# Patient Record
Sex: FEMALE | Race: WHITE | NOT HISPANIC OR LATINO | Employment: OTHER | URBAN - METROPOLITAN AREA
[De-identification: names, ages, dates, MRNs, and addresses within clinical notes are randomized per-mention and may not be internally consistent; named-entity substitution may affect disease eponyms.]

---

## 2017-02-22 ENCOUNTER — OFFICE VISIT (OUTPATIENT)
Dept: OBSTETRICS AND GYNECOLOGY | Facility: CLINIC | Age: 62
End: 2017-02-22

## 2017-02-22 ENCOUNTER — PROCEDURE VISIT (OUTPATIENT)
Dept: OBSTETRICS AND GYNECOLOGY | Facility: CLINIC | Age: 62
End: 2017-02-22

## 2017-02-22 ENCOUNTER — APPOINTMENT (OUTPATIENT)
Dept: WOMENS IMAGING | Facility: HOSPITAL | Age: 62
End: 2017-02-22

## 2017-02-22 VITALS
SYSTOLIC BLOOD PRESSURE: 153 MMHG | WEIGHT: 146 LBS | HEART RATE: 82 BPM | BODY MASS INDEX: 29.43 KG/M2 | DIASTOLIC BLOOD PRESSURE: 82 MMHG | HEIGHT: 59 IN

## 2017-02-22 DIAGNOSIS — Z01.411 ENCOUNTER FOR GYNECOLOGICAL EXAMINATION WITH ABNORMAL FINDING: ICD-10-CM

## 2017-02-22 DIAGNOSIS — Z12.11 COLON CANCER SCREENING: ICD-10-CM

## 2017-02-22 DIAGNOSIS — Z12.31 VISIT FOR SCREENING MAMMOGRAM: Primary | ICD-10-CM

## 2017-02-22 DIAGNOSIS — N76.0 ACUTE VAGINITIS: Primary | ICD-10-CM

## 2017-02-22 LAB — HEMOCCULT STL QL IA: NEGATIVE

## 2017-02-22 PROCEDURE — 77067 SCR MAMMO BI INCL CAD: CPT | Performed by: RADIOLOGY

## 2017-02-22 PROCEDURE — 82274 ASSAY TEST FOR BLOOD FECAL: CPT | Performed by: OBSTETRICS & GYNECOLOGY

## 2017-02-22 PROCEDURE — 99396 PREV VISIT EST AGE 40-64: CPT | Performed by: OBSTETRICS & GYNECOLOGY

## 2017-02-22 PROCEDURE — 77067 SCR MAMMO BI INCL CAD: CPT | Performed by: OBSTETRICS & GYNECOLOGY

## 2017-02-22 RX ORDER — LISINOPRIL 20 MG/1
20 TABLET ORAL DAILY
COMMUNITY

## 2017-02-22 RX ORDER — VENLAFAXINE 37.5 MG/1
37.5 TABLET ORAL 2 TIMES DAILY
COMMUNITY
End: 2017-02-22

## 2017-02-22 RX ORDER — VENLAFAXINE 75 MG/1
75 TABLET ORAL 2 TIMES DAILY
Qty: 180 TABLET | Refills: 3 | Status: SHIPPED | OUTPATIENT
Start: 2017-02-22 | End: 2017-03-02

## 2017-02-22 RX ORDER — CHLORAL HYDRATE 500 MG
CAPSULE ORAL
COMMUNITY

## 2017-02-22 RX ORDER — LOVASTATIN 40 MG/1
40 TABLET ORAL NIGHTLY
COMMUNITY
End: 2019-06-24

## 2017-02-22 RX ORDER — VENLAFAXINE 37.5 MG/1
75 TABLET ORAL 2 TIMES DAILY
COMMUNITY
Start: 2017-02-22 | End: 2017-02-22

## 2017-02-22 RX ORDER — GLIMEPIRIDE 1 MG/1
1 TABLET ORAL
COMMUNITY
End: 2018-03-15

## 2017-02-22 RX ORDER — CETIRIZINE HYDROCHLORIDE 10 MG/1
10 TABLET ORAL DAILY
COMMUNITY

## 2017-02-22 RX ORDER — PANTOPRAZOLE SODIUM 40 MG/1
40 TABLET, DELAYED RELEASE ORAL DAILY
COMMUNITY
End: 2018-06-11

## 2017-02-22 RX ORDER — PHENOL 1.4 %
600 AEROSOL, SPRAY (ML) MUCOUS MEMBRANE DAILY
COMMUNITY
End: 2019-07-17

## 2017-02-22 NOTE — PROGRESS NOTES
"Subjective   Jerilyn Lozano is a 61 y.o. female annual exam.    History of Present Illness    Patient is a 61 y.o. for annual exam. C/o vaginal irritation for the past few months off and on. Patient's sugars have been stable. Patient denies any vaginal discharge. Patient has noted an increase in her hot flashes and would like to increase her effexor.     Health Maintenance   Topic Date Due   • TDAP/TD VACCINES (1 - Tdap) 11/19/1973   • INFLUENZA VACCINE  08/01/2016   • HEPATITIS C SCREENING  02/22/2017   • ZOSTER VACCINE  02/22/2017   • DXA SCAN  02/22/2017   • MAMMOGRAM  02/22/2018   • COLONOSCOPY  04/01/2018       The following portions of the patient's history were reviewed and updated as appropriate: allergies, current medications, past family history, past medical history, past social history, past surgical history and problem list.    Review of Systems   Genitourinary: Negative for menstrual problem and vaginal discharge.   All other systems reviewed and are negative.      Vitals:    02/22/17 1110   BP: 153/82   Pulse: 82   Weight: 146 lb (66.2 kg)   Height: 58.75\" (149.2 cm)       Objective   Physical Exam   Constitutional: She is oriented to person, place, and time. She appears well-developed and well-nourished.   HENT:   Head: Normocephalic and atraumatic.   Eyes: Conjunctivae are normal.   Neck: Neck supple. No thyromegaly present.   Cardiovascular: Normal rate, regular rhythm and normal heart sounds.    Pulmonary/Chest: Effort normal and breath sounds normal. Right breast exhibits no mass and no tenderness. Left breast exhibits no mass and no tenderness.   Abdominal: Soft. She exhibits no distension and no mass. There is no tenderness.   Genitourinary: Vagina normal.   Genitourinary Comments: Uterus, cervix absent, atrophy noted   Musculoskeletal: Normal range of motion.   Neurological: She is alert and oriented to person, place, and time.   Skin: Skin is warm and dry.   Psychiatric: She has a normal mood and " affect. Her behavior is normal.         Assessment/Plan   Jerilyn was seen today for gynecologic exam.    Diagnoses and all orders for this visit:    Acute vaginitis  -     NuSwab Vaginitis (VG)    Encounter for gynecological examination with abnormal finding    Colon cancer screening  -     POC FECAL OCCULT BLOOD BY IMMUNOASSAY    Other orders  -     venlafaxine (EFFEXOR) 75 MG tablet; Take 1 tablet by mouth 2 (Two) Times a Day.    Mammogram today.         Return in about 1 year (around 2/22/2018).

## 2017-02-27 LAB
A VAGINAE DNA VAG QL NAA+PROBE: NORMAL SCORE
BVAB2 DNA VAG QL NAA+PROBE: NORMAL SCORE
C ALBICANS DNA VAG QL NAA+PROBE: NEGATIVE
C GLABRATA DNA VAG QL NAA+PROBE: NEGATIVE
MEGA1 DNA VAG QL NAA+PROBE: NORMAL SCORE
T VAGINALIS RRNA SPEC QL NAA+PROBE: NEGATIVE

## 2017-03-02 ENCOUNTER — TELEPHONE (OUTPATIENT)
Dept: OBSTETRICS AND GYNECOLOGY | Facility: CLINIC | Age: 62
End: 2017-03-02

## 2017-03-02 RX ORDER — VENLAFAXINE HYDROCHLORIDE 75 MG/1
75 CAPSULE, EXTENDED RELEASE ORAL DAILY
Qty: 90 CAPSULE | Refills: 3 | Status: SHIPPED | OUTPATIENT
Start: 2017-03-02 | End: 2018-06-11

## 2017-03-02 NOTE — TELEPHONE ENCOUNTER
----- Message from Zoe Allred sent at 3/2/2017  2:09 PM EST -----  Patient was on Effexor 37.5mg once a day.  Wanted to increase, so was prescribed Effexor 75mg twice a day.  Patient concerned that's it's for twice a day - thinks it should only be for once - wanted me to double check with you.     It was sent over incorrect. Correct prescription of 75 mg once a day sent.

## 2017-03-09 ENCOUNTER — TELEPHONE (OUTPATIENT)
Dept: OBSTETRICS AND GYNECOLOGY | Facility: CLINIC | Age: 62
End: 2017-03-09

## 2017-03-09 NOTE — TELEPHONE ENCOUNTER
----- Message from Korin Russell MA sent at 3/3/2017  9:20 AM EST -----  Left another message to call [email or web address]

## 2018-03-01 ENCOUNTER — TELEPHONE (OUTPATIENT)
Dept: OBSTETRICS AND GYNECOLOGY | Facility: CLINIC | Age: 63
End: 2018-03-01

## 2018-03-01 NOTE — TELEPHONE ENCOUNTER
----- Message from Cynthia Vila MD sent at 3/1/2018  1:16 PM EST -----  Contact: Patient  I am seeing her in March.  I apologize that she received the wrong dose.  I can call in the higher dose if she wants to try it or she can come off of it.    ----- Message -----     From: Maine Felixalonso     Sent: 3/1/2018  10:46 AM       To: Cynthia Vila MD    Patient called stating that the pills you had put her on for hot flashes are not working so she would like to come off of them. She also stated that when she went to  her refill of them they gave her 37.5 mg pills instead of 75 mg, so she wasn't sure if she should just stop taking them or if she should take the 37.5 mg pills to ween herself off of them.     Call back # [phone]

## 2018-03-15 ENCOUNTER — APPOINTMENT (OUTPATIENT)
Dept: WOMENS IMAGING | Facility: HOSPITAL | Age: 63
End: 2018-03-15

## 2018-03-15 ENCOUNTER — OFFICE VISIT (OUTPATIENT)
Dept: OBSTETRICS AND GYNECOLOGY | Facility: CLINIC | Age: 63
End: 2018-03-15

## 2018-03-15 ENCOUNTER — PROCEDURE VISIT (OUTPATIENT)
Dept: OBSTETRICS AND GYNECOLOGY | Facility: CLINIC | Age: 63
End: 2018-03-15

## 2018-03-15 VITALS
SYSTOLIC BLOOD PRESSURE: 142 MMHG | BODY MASS INDEX: 31.25 KG/M2 | WEIGHT: 155 LBS | HEART RATE: 91 BPM | HEIGHT: 59 IN | DIASTOLIC BLOOD PRESSURE: 86 MMHG

## 2018-03-15 DIAGNOSIS — Z01.419 WELL WOMAN EXAM WITH ROUTINE GYNECOLOGICAL EXAM: Primary | ICD-10-CM

## 2018-03-15 DIAGNOSIS — Z12.31 VISIT FOR SCREENING MAMMOGRAM: Primary | ICD-10-CM

## 2018-03-15 DIAGNOSIS — L29.2 VULVAR ITCHING: ICD-10-CM

## 2018-03-15 PROCEDURE — 77067 SCR MAMMO BI INCL CAD: CPT | Performed by: OBSTETRICS & GYNECOLOGY

## 2018-03-15 PROCEDURE — 99396 PREV VISIT EST AGE 40-64: CPT | Performed by: OBSTETRICS & GYNECOLOGY

## 2018-03-15 PROCEDURE — 77067 SCR MAMMO BI INCL CAD: CPT | Performed by: RADIOLOGY

## 2018-03-15 RX ORDER — TRIAMCINOLONE ACETONIDE 5 MG/G
CREAM TOPICAL NIGHTLY
Qty: 15 G | Refills: 2 | Status: SHIPPED | OUTPATIENT
Start: 2018-03-15 | End: 2018-04-14

## 2018-03-15 NOTE — PROGRESS NOTES
"Subjective   Jerilyn Lozano is a 62 y.o. female   Chief Complaint   Patient presents with   • Annual Exam     patient states she is having itching off and on, patient states there is a cut close to the back of the vagina.       History of Present Illness  Jerilyn Lozano is a 62 y.o.  who presents for an annual examination     Reports \"couple weeks ago\" noticed some itching.  Feeling raw in the bottom left aspect of her vagina - off and on for a while.  Mild dysuria, no bleeding. Better with vaginal cream (Vagisil), no exacerbating factors    Pap history:  Last pap: unsure - thinks forty years ago  Prior abnormal paps: no h/o abnormal pap smears and s/p hysterectomy for infection in   DXA:   DXA normal; no risk factors - will plan on screening at 64  Colonoscopy:  yes, 2017 - return in 3 years due to family history  Mammogram: today  STDs  Sexually active: yes  History of STDs: no  Menopause:  Age: late 40's/early 50's  Bleeding since? no  Vasomotor symptoms: yes, mild to moderate  HRT: no, has been on Effexor but weaning off     History of physical abuse: no, History of sexual abuse: no and History of verbal/emotional abuse: no    BRCA screening:  Is patient's family or personal history significant for any of the following? no  For non-[employer] women:   Two first-degree relatives with breast cancer, one of whom was diagnosed at age 50 or younger   A combination of three or more first or second-degree relatives with breast cancer regardless of age at diagnosis   A combination of both breast and ovarian cancer among first and second-degree relatives   A first-degree relative with bilateral breast cancer   A combination of two or more first or second degree relatives with ovarian cancer, regardless of age at diagnosis   A first or second-degree relative with both breast and ovarian cancer at any age   History of breast cancer in a male relative   For women of [employer] descent:   Any first-degree " relative (or two second degree relatives on the same side of the family) with breast or ovarian cancer   Recommendations from the [place] Preventive Services Task Force on who should be offered genetic testing for BRCA mutations*     Past Medical History:   Diagnosis Date   • Lynn esophagus    • Diabetes    • GERD (gastroesophageal reflux disease)    • Hyperlipidemia    • Hypertension    • Osteoarthritis    • Seasonal allergies      Past Surgical History:   Procedure Laterality Date   • EYE SURGERY     • GALLBLADDER SURGERY     • HYSTERECTOMY     • SINUS SURGERY       Social History   Substance Use Topics   • Smoking status: Never Smoker   • Smokeless tobacco: Never Used   • Alcohol use Yes      Comment: rarely     Family History   Problem Relation Age of Onset   • Colon cancer Mother    • Hypertension Mother    • Brain cancer Mother    • Diabetes Father    • Colon polyps Father    • Gout Father    • Heart disease Sister    • Hypertension Sister    • Gout Sister    • Hypertension Brother    • Breast cancer Neg Hx    • Ovarian cancer Neg Hx    • Uterine cancer Neg Hx      Current Outpatient Prescriptions on File Prior to Visit   Medication Sig Dispense Refill   • calcium carbonate (OS-ACNDELARIA) 600 MG tablet Take 600 mg by mouth Daily.     • cetirizine (zyrTEC) 10 MG tablet Take 10 mg by mouth Daily.     • Lactobacillus (ACIDOPHILUS PO) Take  by mouth.     • lisinopril (PRINIVIL,ZESTRIL) 20 MG tablet Take 20 mg by mouth Daily.     • lovastatin (MEVACOR) 40 MG tablet Take 40 mg by mouth Every Night.     • metFORMIN (GLUCOPHAGE) 1000 MG tablet Take 1,000 mg by mouth 2 (Two) Times a Day With Meals.     • Multiple Vitamin (MULTI VITAMIN DAILY PO) Take  by mouth.     • Omega-3 Fatty Acids (FISH OIL) 1000 MG capsule capsule Take  by mouth Daily With Breakfast.     • pantoprazole (PROTONIX) 40 MG EC tablet Take 40 mg by mouth Daily.     • venlafaxine XR (EFFEXOR-XR) 75 MG 24 hr capsule Take 1 capsule by mouth Daily. 90  "capsule 3   • [DISCONTINUED] Biotin 5000 MCG capsule Take  by mouth.     • [DISCONTINUED] glimepiride (AMARYL) 1 MG tablet Take 1 mg by mouth Every Morning Before Breakfast.       No current facility-administered medications on file prior to visit.      Allergies   Allergen Reactions   • Penicillins Hives   • Sulfa Antibiotics Rash        Review of Systems   Constitutional: Negative for activity change, appetite change, chills, fatigue, fever and unexpected weight change.   HENT: Negative for congestion, nosebleeds and sore throat.    Eyes: Negative for visual disturbance.   Respiratory: Negative for cough, chest tightness and shortness of breath.    Cardiovascular: Negative for chest pain and palpitations.   Gastrointestinal: Negative for abdominal pain, constipation, diarrhea, nausea and vomiting.   Endocrine: Negative for polyuria.   Genitourinary: Positive for dysuria, vaginal discharge and vaginal pain. Negative for difficulty urinating, dyspareunia, frequency, genital sores, hematuria, menstrual problem, pelvic pain, urgency and vaginal bleeding.   Musculoskeletal: Negative for arthralgias and joint swelling.   Skin: Negative for color change and rash.   Neurological: Negative for dizziness, light-headedness and headaches.   Hematological: Does not bruise/bleed easily.   Psychiatric/Behavioral: Negative for agitation and dysphoric mood. The patient is not nervous/anxious.        Objective    /86   Pulse 91   Ht 149.2 cm (58.74\")   Wt 70.3 kg (155 lb)   BMI 31.58 kg/m²    Physical Exam   Constitutional: She is oriented to person, place, and time. She appears well-developed and well-nourished. No distress.   HENT:   Head: Normocephalic and atraumatic.   Neck: No tracheal deviation present. No thyromegaly present.   Cardiovascular: Normal rate, regular rhythm and normal heart sounds.  Exam reveals no gallop and no friction rub.    No murmur heard.  Pulmonary/Chest: Effort normal and breath sounds " normal. No respiratory distress. She has no wheezes. She has no rales. She exhibits no tenderness. Right breast exhibits no inverted nipple, no mass, no nipple discharge, no skin change and no tenderness. Left breast exhibits no inverted nipple, no mass, no nipple discharge, no skin change and no tenderness.   Abdominal: Soft. She exhibits no distension and no mass. There is no tenderness.   Genitourinary: No labial fusion. There is no rash, lesion or injury on the right labia. There is no rash, lesion or injury on the left labia. Right adnexum displays no mass, no tenderness and no fullness. Left adnexum displays no mass, no tenderness and no fullness. No tenderness or bleeding in the vagina. No vaginal discharge found.   Genitourinary Comments: Surgically absent uterus, cervix  Cuff intact   Musculoskeletal: Normal range of motion. She exhibits no edema or deformity.   Lymphadenopathy:     She has no cervical adenopathy.   Neurological: She is alert and oriented to person, place, and time.   Skin: Skin is warm and dry. No rash noted. She is not diaphoretic.   Psychiatric: She has a normal mood and affect. Her behavior is normal. Judgment and thought content normal.         Assessment/Plan   Jerilyn was seen today for annual exam.    Diagnoses and all orders for this visit:    Well woman exam with routine gynecological exam    Vulvar itching  -     NuSwab BV & Candida - , Vagina    Other orders  -     triamcinolone (KENALOG) 0.5 % cream; Apply  topically Every Night for 30 days.      Well woman counseling/screening:    Cervical cancer screening:    Reports remote h/o cervical dysplasia   S/p hysterectomy for infection    Screening guidelines discussed with patient  Breast cancer screening:    Mammogram UTD   Breast self awareness encouraged  Colonscopy:   Due in 2020  DXA   Screening at 65, low risk FRAX  Family history    does not demonstrate need for genetics referral   Healthy lifestyle counseling:   Patient was  counseled on    Body mass index is 31.58 kg/m².             Vaginal itching/dysuria:  UA within normal limits   Some erythema noted with concern for lichenoid diseases.  Recommended biopsy. Patient will treat with steroid for now and if no improvement, biopsy at next visit.  Return in three months to re-examine or sooner if symptoms worsen/fail to improve  Return with any ulceration or lesion    BMI Counseling  Her BMI is classified as BMI 30.0-34.9        Classification: class I obesity  Is Jerilyn Lozano ready to make a healthy lifestyle change today? yes  Patient has tried exercise and dietary change  Today, the course of action is: lose 10-15lb by next annual

## 2018-03-18 LAB
A VAGINAE DNA VAG QL NAA+PROBE: NORMAL SCORE
BVAB2 DNA VAG QL NAA+PROBE: NORMAL SCORE
C ALBICANS DNA VAG QL NAA+PROBE: NEGATIVE
C GLABRATA DNA VAG QL NAA+PROBE: NEGATIVE
Lab: NORMAL
MEGA1 DNA VAG QL NAA+PROBE: NORMAL SCORE

## 2018-03-19 NOTE — PROGRESS NOTES
Please call patient and let her know that her test results were normal.  If she has any questions, I am happy to answer them. Thanks!

## 2018-03-21 ENCOUNTER — TELEPHONE (OUTPATIENT)
Dept: OBSTETRICS AND GYNECOLOGY | Facility: CLINIC | Age: 63
End: 2018-03-21

## 2018-03-21 NOTE — TELEPHONE ENCOUNTER
----- Message from Cynthia Vila MD sent at 3/19/2018  5:16 PM EDT -----  Please call patient and let her know that her test results were normal.  If she has any questions, I am happy to answer them. Thanks!

## 2018-03-21 NOTE — TELEPHONE ENCOUNTER
----- Message from Cynthia Vila MD sent at 3/19/2018  5:16 PM EDT -----  Please call patient and let her know that her test results were normal.  If she has any questions, I am happy to answer them. Thanks!    patient's  informed (ok per  verbal release)

## 2018-06-11 ENCOUNTER — OFFICE VISIT (OUTPATIENT)
Dept: OBSTETRICS AND GYNECOLOGY | Facility: CLINIC | Age: 63
End: 2018-06-11

## 2018-06-11 VITALS
BODY MASS INDEX: 31.25 KG/M2 | WEIGHT: 155 LBS | HEART RATE: 101 BPM | DIASTOLIC BLOOD PRESSURE: 87 MMHG | SYSTOLIC BLOOD PRESSURE: 160 MMHG | HEIGHT: 59 IN

## 2018-06-11 DIAGNOSIS — L29.2 VULVAR ITCHING: Primary | ICD-10-CM

## 2018-06-11 PROCEDURE — 99213 OFFICE O/P EST LOW 20 MIN: CPT | Performed by: OBSTETRICS & GYNECOLOGY

## 2018-06-11 RX ORDER — OMEPRAZOLE 40 MG/1
CAPSULE, DELAYED RELEASE ORAL
COMMUNITY
Start: 2018-04-09

## 2018-06-11 RX ORDER — GLIMEPIRIDE 1 MG/1
TABLET ORAL
COMMUNITY
Start: 2018-04-26 | End: 2022-03-28

## 2018-06-11 RX ORDER — SILVER SULFADIAZINE 1 %
CREAM (GRAM) TOPICAL
COMMUNITY
Start: 2018-06-07 | End: 2018-09-06

## 2018-06-11 NOTE — PROGRESS NOTES
"Subjective   Cori Lozano is a 62 y.o. female   Chief Complaint   Patient presents with   • vulvar itching      History of Present Illness   Reports symptoms have greatly improved.  Is using cream\" when she remembers.\"    She also burnt her arm and upper chest on a cup of boiling water recently.  Went to PCP and has been using silver nitrate.      Past Medical History:   Diagnosis Date   • Lynn esophagus    • Diabetes    • GERD (gastroesophageal reflux disease)    • Hyperlipidemia    • Hypertension    • Osteoarthritis    • Seasonal allergies      Past Surgical History:   Procedure Laterality Date   • EYE SURGERY     • GALLBLADDER SURGERY     • HYSTERECTOMY     • SINUS SURGERY       Social History   Substance Use Topics   • Smoking status: Never Smoker   • Smokeless tobacco: Never Used   • Alcohol use Yes      Comment: rarely     Family History   Problem Relation Age of Onset   • Colon cancer Mother    • Hypertension Mother    • Brain cancer Mother    • Diabetes Father    • Colon polyps Father    • Gout Father    • Heart disease Sister    • Hypertension Sister    • Gout Sister    • Hypertension Brother    • Breast cancer Cousin         40/50   • Ovarian cancer Neg Hx    • Uterine cancer Neg Hx          Review of Systems   Constitutional: Negative for chills and fever.   Respiratory: Negative for shortness of breath.    Cardiovascular: Negative for chest pain.   Gastrointestinal: Negative for abdominal pain.   Genitourinary: Negative for difficulty urinating, pelvic pain and vaginal bleeding.   Musculoskeletal: Negative for myalgias.   Neurological: Negative for dizziness and light-headedness.       Objective    /87   Pulse 101   Ht 149.2 cm (58.74\")   Wt 70.3 kg (155 lb)   BMI 31.58 kg/m²    Physical Exam   Constitutional: She is oriented to person, place, and time. She appears well-developed and well-nourished. No distress.   HENT:   Head: Normocephalic and atraumatic.   Eyes: EOM are normal. No " scleral icterus.   Pulmonary/Chest: Effort normal and breath sounds normal.   Abdominal: There is no tenderness.   Genitourinary: There is no rash, tenderness, lesion or injury on the right labia. There is no rash, tenderness, lesion or injury on the left labia.   Genitourinary Comments: Vulva without any erythema or ulceration   Neurological: She is alert and oriented to person, place, and time.   Skin: Skin is warm and dry. She is not diaphoretic.   Psychiatric: She has a normal mood and affect. Her behavior is normal. Judgment and thought content normal.         Assessment/Plan   Problems Addressed this Visit     None      Visit Diagnoses     Vulvar itching    -  Primary    Relevant Medications    SSD 1 % cream      Fold appears normal.  Given symptom resolution and no skin changes do not feel like a biopsy is necessary at this time.  Reviewed with patient if symptoms were to recur would recommend biopsy.  Can continue intermittent topical steroids. Would recommend low to mid potency PRN

## 2018-09-06 ENCOUNTER — OFFICE VISIT (OUTPATIENT)
Dept: OBSTETRICS AND GYNECOLOGY | Facility: CLINIC | Age: 63
End: 2018-09-06

## 2018-09-06 VITALS
HEART RATE: 90 BPM | WEIGHT: 158 LBS | HEIGHT: 58 IN | SYSTOLIC BLOOD PRESSURE: 154 MMHG | BODY MASS INDEX: 33.17 KG/M2 | DIASTOLIC BLOOD PRESSURE: 86 MMHG

## 2018-09-06 DIAGNOSIS — R39.15 URINARY URGENCY: Primary | ICD-10-CM

## 2018-09-06 LAB
BILIRUB BLD-MCNC: NEGATIVE MG/DL
GLUCOSE UR STRIP-MCNC: NEGATIVE MG/DL
KETONES UR QL: NEGATIVE
LEUKOCYTE EST, POC: ABNORMAL
NITRITE UR-MCNC: NEGATIVE MG/ML
PH UR: 5.5 [PH] (ref 5–8)
PROT UR STRIP-MCNC: NEGATIVE MG/DL
RBC # UR STRIP: ABNORMAL /UL
SP GR UR: 1 (ref 1–1.03)
UROBILINOGEN UR QL: NORMAL

## 2018-09-06 PROCEDURE — 81003 URINALYSIS AUTO W/O SCOPE: CPT | Performed by: OBSTETRICS & GYNECOLOGY

## 2018-09-06 PROCEDURE — 99213 OFFICE O/P EST LOW 20 MIN: CPT | Performed by: OBSTETRICS & GYNECOLOGY

## 2018-09-06 RX ORDER — NITROFURANTOIN 25; 75 MG/1; MG/1
100 CAPSULE ORAL 2 TIMES DAILY
Qty: 14 CAPSULE | Refills: 0 | Status: SHIPPED | OUTPATIENT
Start: 2018-09-06 | End: 2018-09-13

## 2018-09-06 NOTE — PROGRESS NOTES
Subjective   Cori Lozano is a 62 y.o. female c/o difficulty urinating since this past weekend. Pt's feels the urge to urinate, but not much void when she goes, and with a constant urge to go. Pt has tried treating with Cranberry pills and juice without relief. Pt tried a 3 day OTC Monistat treatment as well thinking it could be a yeast infection. Pt denies any burning or pain.      History of Present Illness   Patient presents with increased urinary frequency over the last week.  She was out of town the week and was unable to follow up sooner.  She reports some pelvic pressure, denies any vaginal discharge.  She has tried cranberry pills, juice without improvement.  She also tried Monistat but thinks that it is not a vaginal infection.  Denies burning, no change in her chronic back pain, no fevers.  Denies abnormal color or odor to urine    Past Medical History:   Diagnosis Date   • Lynn esophagus    • Diabetes (CMS/HCC)    • GERD (gastroesophageal reflux disease)    • Hyperlipidemia    • Hypertension    • Osteoarthritis    • Seasonal allergies      Past Surgical History:   Procedure Laterality Date   • EYE SURGERY     • GALLBLADDER SURGERY     • HYSTERECTOMY     • SINUS SURGERY       Social History   Substance Use Topics   • Smoking status: Never Smoker   • Smokeless tobacco: Never Used   • Alcohol use Yes      Comment: rarely     Family History   Problem Relation Age of Onset   • Colon cancer Mother    • Hypertension Mother    • Brain cancer Mother    • Diabetes Father    • Colon polyps Father    • Gout Father    • Heart disease Sister    • Hypertension Sister    • Gout Sister    • Hypertension Brother    • Breast cancer Cousin         40/50   • Ovarian cancer Neg Hx    • Uterine cancer Neg Hx         Review of Systems   Constitutional: Negative.    HENT: Negative.    Respiratory: Negative.    Cardiovascular: Negative.    Gastrointestinal: Negative.    Endocrine: Negative.    Genitourinary: Positive for  "frequency and urgency. Negative for vaginal bleeding, vaginal discharge and vaginal pain.   Musculoskeletal: Negative.    Skin: Negative.    Allergic/Immunologic: Negative.    Neurological: Negative.    Psychiatric/Behavioral: Negative.        Objective    /86   Pulse 90   Ht 147.3 cm (58\")   Wt 71.7 kg (158 lb)   Breastfeeding? No   BMI 33.02 kg/m²    Physical Exam   Constitutional: She is oriented to person, place, and time. She appears well-developed and well-nourished. No distress.   HENT:   Head: Normocephalic and atraumatic.   Eyes: No scleral icterus.   Neck: Normal range of motion.   Cardiovascular: Normal rate.    Pulmonary/Chest: Effort normal.   Abdominal: Soft.   Musculoskeletal: Normal range of motion.   Neurological: She is alert and oriented to person, place, and time. Coordination normal.   Skin: Skin is warm and dry. She is not diaphoretic.   Psychiatric: She has a normal mood and affect. Her behavior is normal.         Assessment/Plan   Cori was seen today for difficulty urinating.    Diagnoses and all orders for this visit:    Urinary urgency  -     POC Urinalysis Dipstick, Automated  -     Urine Culture - Urine, Urine, Clean Catch    Other orders  -     nitrofurantoin, macrocrystal-monohydrate, (MACROBID) 100 MG capsule; Take 1 capsule by mouth 2 (Two) Times a Day for 7 days.    Large WBC on UA, will send culture and treat empirically.  Reviewed if no improvement, recommend follow up with pelvic exam.    Reviewed if worsening signs develop such as fever, vomiting should go to ED.    Patient expressed agreement in understanding.  BP elevated today, known hypertension. Encouraged compliance with antihypertensive therapy           "

## 2018-09-11 LAB
BACTERIA UR CULT: ABNORMAL
BACTERIA UR CULT: ABNORMAL
OTHER ANTIBIOTIC SUSC ISLT: ABNORMAL

## 2018-09-12 ENCOUNTER — TELEPHONE (OUTPATIENT)
Dept: OBSTETRICS AND GYNECOLOGY | Facility: CLINIC | Age: 63
End: 2018-09-12

## 2018-09-12 NOTE — TELEPHONE ENCOUNTER
09/12/18  Patient informed of results and antibiotic. Patient reported she feels better.     ----- Message from Cynthia Vila MD sent at 9/11/2018 10:10 AM EDT -----  Please let patient know that her urine grew E.coli and the antibiotic I gave her should treat the infection. Thanks!

## 2018-10-10 ENCOUNTER — OFFICE VISIT (OUTPATIENT)
Dept: OBSTETRICS AND GYNECOLOGY | Facility: CLINIC | Age: 63
End: 2018-10-10

## 2018-10-10 VITALS
HEART RATE: 94 BPM | BODY MASS INDEX: 32.54 KG/M2 | HEIGHT: 58 IN | SYSTOLIC BLOOD PRESSURE: 131 MMHG | DIASTOLIC BLOOD PRESSURE: 84 MMHG | WEIGHT: 155 LBS

## 2018-10-10 DIAGNOSIS — R35.0 FREQUENT URINATION: Primary | ICD-10-CM

## 2018-10-10 LAB
BILIRUB BLD-MCNC: NEGATIVE MG/DL
CLARITY, POC: CLEAR
COLOR UR: YELLOW
GLUCOSE UR STRIP-MCNC: NEGATIVE MG/DL
KETONES UR QL: NEGATIVE
LEUKOCYTE EST, POC: ABNORMAL
NITRITE UR-MCNC: NEGATIVE MG/ML
PH UR: 5 [PH] (ref 5–8)
PROT UR STRIP-MCNC: NEGATIVE MG/DL
RBC # UR STRIP: NEGATIVE /UL
SP GR UR: 1 (ref 1–1.03)
UROBILINOGEN UR QL: NORMAL

## 2018-10-10 PROCEDURE — 99213 OFFICE O/P EST LOW 20 MIN: CPT | Performed by: OBSTETRICS & GYNECOLOGY

## 2018-10-10 PROCEDURE — 81003 URINALYSIS AUTO W/O SCOPE: CPT | Performed by: OBSTETRICS & GYNECOLOGY

## 2018-10-10 RX ORDER — TRIAMCINOLONE ACETONIDE 1 MG/G
CREAM TOPICAL AS NEEDED
Qty: 80 G | Refills: 1 | Status: SHIPPED | OUTPATIENT
Start: 2018-10-10 | End: 2019-03-21

## 2018-10-10 RX ORDER — TRIAMCINOLONE ACETONIDE 1 MG/G
CREAM TOPICAL AS NEEDED
COMMUNITY
End: 2018-10-10 | Stop reason: SDUPTHER

## 2018-10-10 NOTE — PROGRESS NOTES
"Subjective   Cori Lozano is a 62 y.o. female   Chief Complaint   Patient presents with   • Urinary Frequency     patient reports pressure and burning but not when she urines.       History of Present Illness  Patient presents with pressure and burning in vaginal area. Started one week ago.  Denies any urinary frequency.  She has not noticed vaginal discharge. Has not been using her steroid cream.  No fever or chills.  Sensation seems to come and go.   Past Medical History:   Diagnosis Date   • Lynn esophagus    • Diabetes (CMS/HCC)    • GERD (gastroesophageal reflux disease)    • Hyperlipidemia    • Hypertension    • Osteoarthritis    • Seasonal allergies      Past Surgical History:   Procedure Laterality Date   • EYE SURGERY     • GALLBLADDER SURGERY     • HYSTERECTOMY     • SINUS SURGERY       Social History   Substance Use Topics   • Smoking status: Never Smoker   • Smokeless tobacco: Never Used   • Alcohol use Yes      Comment: rarely     Family History   Problem Relation Age of Onset   • Colon cancer Mother    • Hypertension Mother    • Brain cancer Mother    • Diabetes Father    • Colon polyps Father    • Gout Father    • Heart disease Sister    • Hypertension Sister    • Gout Sister    • Hypertension Brother    • Breast cancer Cousin         40/50   • Ovarian cancer Neg Hx    • Uterine cancer Neg Hx          Review of Systems   Constitutional: Negative for activity change, appetite change, fatigue, fever and unexpected weight change.   Gastrointestinal: Negative for abdominal pain, nausea and vomiting.   Genitourinary: Positive for pelvic pain (burning, pressure). Negative for frequency, menstrual problem, urgency, vaginal bleeding, vaginal discharge and vaginal pain.   Hematological: Does not bruise/bleed easily.   Psychiatric/Behavioral: Negative for agitation.       Objective    /84   Pulse 94   Ht 147.3 cm (57.99\")   Wt 70.3 kg (155 lb)   BMI 32.40 kg/m²    Physical Exam   Constitutional: " She is oriented to person, place, and time. She appears well-developed and well-nourished.   HENT:   Head: Normocephalic and atraumatic.   Eyes: EOM are normal. No scleral icterus.   Neck: Normal range of motion.   Cardiovascular: Normal rate.    Pulmonary/Chest: Effort normal. No respiratory distress.   Abdominal: Soft.   Genitourinary: Uterus normal.       There is no rash, tenderness, lesion or injury on the right labia. There is no rash, tenderness, lesion or injury on the left labia. No vaginal discharge found.   Neurological: She is alert and oriented to person, place, and time.   Skin: Skin is warm and dry.   Psychiatric: She has a normal mood and affect. Her behavior is normal.         Assessment/Plan   Problems Addressed this Visit     None      Visit Diagnoses     Frequent urination    -  Primary    Relevant Orders    POC Urinalysis Dipstick, Automated (Completed)    Urine Culture - Urine, Urine, Clean Catch      UA with mild leukocytes. Send culture  Encouraged patient to use cream for the next two weeks. If no resolution return for biopsy.

## 2018-10-11 ENCOUNTER — TELEPHONE (OUTPATIENT)
Dept: OBSTETRICS AND GYNECOLOGY | Facility: CLINIC | Age: 63
End: 2018-10-11

## 2018-10-11 NOTE — TELEPHONE ENCOUNTER
----- Message from Cynthia Vila MD sent at 10/10/2018  3:57 PM EDT -----  It will be to the vulva.  Can you call him back? Thanks!  ----- Message -----  From: Amie Tadeo  Sent: 10/10/2018   3:45 PM  To: Cynthia Vila MD    [facility] pharmacy([facility]) called asking what area of the body will the patient be applying the Kenalog RX too. Needs to know for insurance billing purposes.    Please advise    Pharmacy # is [phone]

## 2018-10-12 ENCOUNTER — TELEPHONE (OUTPATIENT)
Dept: OBSTETRICS AND GYNECOLOGY | Facility: CLINIC | Age: 63
End: 2018-10-12

## 2018-10-12 RX ORDER — NITROFURANTOIN 25; 75 MG/1; MG/1
100 CAPSULE ORAL 2 TIMES DAILY
Qty: 14 CAPSULE | Refills: 0 | Status: SHIPPED | OUTPATIENT
Start: 2018-10-12 | End: 2018-10-19

## 2018-10-12 NOTE — TELEPHONE ENCOUNTER
Called patient to review urine culture again showing E.coli.  Patient is allergic to penicillin with reaction of hives, she is allergic to sulfa with reaction of rash.  She has not taken a cephalosporin before.  Given her allergies and the culture, we will try Macrobid again.  She did have some real leaf from the Macrobid for several weeks before it seemed like the symptoms came back.  Reviewed patient should call if symptoms do not improve or go to ED with fever, back pain.  Rx sent to pharmacy

## 2018-10-14 LAB
BACTERIA UR CULT: ABNORMAL
BACTERIA UR CULT: ABNORMAL
OTHER ANTIBIOTIC SUSC ISLT: ABNORMAL

## 2018-11-06 ENCOUNTER — OFFICE VISIT (OUTPATIENT)
Dept: OBSTETRICS AND GYNECOLOGY | Facility: CLINIC | Age: 63
End: 2018-11-06

## 2018-11-06 VITALS
SYSTOLIC BLOOD PRESSURE: 139 MMHG | WEIGHT: 155 LBS | HEIGHT: 58 IN | BODY MASS INDEX: 32.54 KG/M2 | HEART RATE: 87 BPM | DIASTOLIC BLOOD PRESSURE: 84 MMHG

## 2018-11-06 DIAGNOSIS — R10.2 PELVIC PRESSURE IN FEMALE: ICD-10-CM

## 2018-11-06 DIAGNOSIS — N89.8 VAGINAL IRRITATION: Primary | ICD-10-CM

## 2018-11-06 PROCEDURE — 99213 OFFICE O/P EST LOW 20 MIN: CPT | Performed by: OBSTETRICS & GYNECOLOGY

## 2018-11-06 RX ORDER — ESTRADIOL 10 UG/1
INSERT VAGINAL
Qty: 14 TABLET | Refills: 3 | Status: SHIPPED | OUTPATIENT
Start: 2018-11-06 | End: 2019-06-24

## 2018-11-06 NOTE — PROGRESS NOTES
Subjective   Cori Lozano is a 62 y.o. female   Chief Complaint   Patient presents with   • Urinary Tract Infection     patient states she still has pressure and burning not necessarily when urinating.         History of Present Illness  Patient reports she completed her antibiotic therapy.  She is still having symptoms of pressure and off and on burning in the vaginal area.  She denies dysuria.  Denies fevers or chills.  She notes that she has been using her triamcinolone cream with some improvement.  Denies fevers or chills.  Denies change in her urinary odor or appearance.  For her antibiotic allergies: She was on penicillin and related antibiotics for a sinus infection and on the very last pill broke out in hives.  This was several years ago  Bactrim: She had a rash this was also several years ago.  Past Medical History:   Diagnosis Date   • Lynn esophagus    • Diabetes (CMS/HCC)    • GERD (gastroesophageal reflux disease)    • Hyperlipidemia    • Hypertension    • Osteoarthritis    • Seasonal allergies      Past Surgical History:   Procedure Laterality Date   • EYE SURGERY     • GALLBLADDER SURGERY     • HYSTERECTOMY     • SINUS SURGERY       Social History   Substance Use Topics   • Smoking status: Never Smoker   • Smokeless tobacco: Never Used   • Alcohol use Yes      Comment: rarely     Family History   Problem Relation Age of Onset   • Colon cancer Mother    • Hypertension Mother    • Brain cancer Mother    • Diabetes Father    • Colon polyps Father    • Gout Father    • Heart disease Sister    • Hypertension Sister    • Gout Sister    • Hypertension Brother    • Breast cancer Cousin         40/50   • Ovarian cancer Neg Hx    • Uterine cancer Neg Hx      Review of Systems   Constitutional: Negative for activity change, appetite change, fatigue, fever and unexpected weight change.   Gastrointestinal: Negative for abdominal pain, nausea and vomiting.   Genitourinary: Positive for vaginal pain (burning).  "Negative for difficulty urinating, dyspareunia, dysuria, frequency, menstrual problem, vaginal bleeding and vaginal discharge.   Hematological: Does not bruise/bleed easily.   Psychiatric/Behavioral: Negative for agitation.       Objective    /84   Pulse 87   Ht 147.3 cm (57.99\")   Wt 70.3 kg (155 lb)   BMI 32.40 kg/m²    Physical Exam   Constitutional: She is oriented to person, place, and time. She appears well-developed and well-nourished. No distress.   HENT:   Head: Normocephalic and atraumatic.   Eyes: EOM are normal. No scleral icterus.   Pulmonary/Chest: Effort normal and breath sounds normal.   Abdominal: There is no tenderness.   Genitourinary: There is no rash, tenderness, lesion or injury on the right labia. There is no rash, tenderness, lesion or injury on the left labia.   Neurological: She is alert and oriented to person, place, and time.   Skin: Skin is warm and dry. She is not diaphoretic.   Psychiatric: She has a normal mood and affect. Her behavior is normal. Judgment and thought content normal.         Assessment/Plan   Problems Addressed this Visit     None      Visit Diagnoses     Vaginal irritation    -  Primary    Relevant Orders    Urine Culture - Urine, Urine, Clean Catch    Pelvic pressure in female        Relevant Orders    Urine Culture - Urine, Urine, Clean Catch      UA with small leukocytes.  John culture.  Reviewed with patient that her options for antibiotic care per limited due to her allergies.  If culture results, may have to consider ciprofloxacin versus allergy testing for penicillin and Bactrim.  Vulvar exam appears consistent with atrophy.  Patient has significant tenderness to palpation around the urethra.  Discussed can try local estrogen therapy to see if this improves some of the burning as it is not coinciding with urination alone at this time.  Recommend trial of 2-3 months and return for evaluation.             "

## 2018-11-09 ENCOUNTER — TELEPHONE (OUTPATIENT)
Dept: OBSTETRICS AND GYNECOLOGY | Facility: CLINIC | Age: 63
End: 2018-11-09

## 2018-11-09 LAB
BACTERIA UR CULT: ABNORMAL
BACTERIA UR CULT: ABNORMAL
OTHER ANTIBIOTIC SUSC ISLT: ABNORMAL

## 2018-11-09 RX ORDER — GRANULES FOR ORAL 3 G/1
3 POWDER ORAL ONCE
Qty: 3 G | Refills: 0 | Status: SHIPPED | OUTPATIENT
Start: 2018-11-09 | End: 2018-11-09

## 2018-11-12 ENCOUNTER — TELEPHONE (OUTPATIENT)
Dept: OBSTETRICS AND GYNECOLOGY | Facility: CLINIC | Age: 63
End: 2018-11-12

## 2018-11-12 NOTE — TELEPHONE ENCOUNTER
Pharmacy called and stated the medication called in for  Estradiol will need a pre auth. Pharmacy wants to know if you would like to prescribe a different medication or go through and wait for pre auth? Please advise.

## 2018-11-13 ENCOUNTER — TELEPHONE (OUTPATIENT)
Dept: OBSTETRICS AND GYNECOLOGY | Facility: CLINIC | Age: 63
End: 2018-11-13

## 2018-11-13 RX ORDER — CIPROFLOXACIN 250 MG/1
250 TABLET, FILM COATED ORAL 2 TIMES DAILY
Qty: 6 TABLET | Refills: 0 | Status: SHIPPED | OUTPATIENT
Start: 2018-11-13 | End: 2018-11-16

## 2018-11-13 NOTE — TELEPHONE ENCOUNTER
Pt contacted the pharmacy about her fosfomycin (MONUROL) 3 g pack. It will cost her over $200 and also the pharmacy doesn't have it in stock so she is asking if you can prescribe something else. RX # in chart, call back # [phone]

## 2018-11-13 NOTE — TELEPHONE ENCOUNTER
Called patient back and discussed options.  She does not want to try a relative of penicillin due to hive reaction.  We discussed black box warning of ciprofloxacin and patient is understanding but would like to proceed with treatment as she is allergic to Penicillin with hive allergy, does not want to Bactrim due to allergy, has failed macrobid, and fosfomycin is cost prohibitive.  Patient to call with any issues.      Asking if she should have bone density.  Last was march 2009 and was normal.  She did not have diabetes at that time, so reasonable to repeat.  Patient will call to schedule

## 2019-03-21 ENCOUNTER — PROCEDURE VISIT (OUTPATIENT)
Dept: OBSTETRICS AND GYNECOLOGY | Facility: CLINIC | Age: 64
End: 2019-03-21

## 2019-03-21 ENCOUNTER — APPOINTMENT (OUTPATIENT)
Dept: WOMENS IMAGING | Facility: HOSPITAL | Age: 64
End: 2019-03-21

## 2019-03-21 ENCOUNTER — OFFICE VISIT (OUTPATIENT)
Dept: OBSTETRICS AND GYNECOLOGY | Facility: CLINIC | Age: 64
End: 2019-03-21

## 2019-03-21 VITALS
HEART RATE: 87 BPM | WEIGHT: 153 LBS | SYSTOLIC BLOOD PRESSURE: 147 MMHG | BODY MASS INDEX: 31.99 KG/M2 | DIASTOLIC BLOOD PRESSURE: 93 MMHG

## 2019-03-21 DIAGNOSIS — Z12.31 VISIT FOR SCREENING MAMMOGRAM: Primary | ICD-10-CM

## 2019-03-21 DIAGNOSIS — Z01.419 WELL WOMAN EXAM WITH ROUTINE GYNECOLOGICAL EXAM: Primary | ICD-10-CM

## 2019-03-21 PROCEDURE — 77067 SCR MAMMO BI INCL CAD: CPT | Performed by: OBSTETRICS & GYNECOLOGY

## 2019-03-21 PROCEDURE — 99396 PREV VISIT EST AGE 40-64: CPT | Performed by: OBSTETRICS & GYNECOLOGY

## 2019-03-21 PROCEDURE — 77067 SCR MAMMO BI INCL CAD: CPT | Performed by: RADIOLOGY

## 2019-03-21 NOTE — PROGRESS NOTES
Subjective   Cori Lozano is a 63 y.o. female   Chief Complaint   Patient presents with   • Gynecologic Exam     pt here for annual exam.  Pap-   Mammo- 3/2019       History of Present Illness  Cori Lozano is a 63 y.o.  who presents for an annual examination   Feels that UTI symptoms have resolved    Pap history:  Last pap: unsure - thinks forty years ago  Prior abnormal paps: no h/o abnormal pap smears and s/p hysterectomy for infection in   DXA:  2009 DXA normal; no risk factors - will plan on screening at 64 - next year  Colonoscopy:  yes,  thinks it is return in 5years (got notice in mail)  Mammogram: today  STDs  Sexually active: yes  History of STDs: no  Menopause:  Age: late 40's/early 50's  Bleeding since? no  Vasomotor symptoms: yes, mild to moderate  HRT: no, has been on Effexor but weaning off     History of physical abuse: no, History of sexual abuse: no and History of verbal/emotional abuse: no    BRCA screening:  Is patient's family or personal history significant for any of the following? no  For non-[employer] women:   Two first-degree relatives with breast cancer, one of whom was diagnosed at age 50 or younger   A combination of three or more first or second-degree relatives with breast cancer regardless of age at diagnosis   A combination of both breast and ovarian cancer among first and second-degree relatives   A first-degree relative with bilateral breast cancer   A combination of two or more first or second degree relatives with ovarian cancer, regardless of age at diagnosis   A first or second-degree relative with both breast and ovarian cancer at any age   History of breast cancer in a male relative   For women of [employer] descent:   Any first-degree relative (or two second degree relatives on the same side of the family) with breast or ovarian cancer   Recommendations from the [place] Preventive Services Task Force on who should be offered genetic  testing for BRCA mutations*     Past Medical History:   Diagnosis Date   • Lynn esophagus    • Diabetes (CMS/HCC)    • GERD (gastroesophageal reflux disease)    • Hyperlipidemia    • Hypertension    • Osteoarthritis    • Seasonal allergies      Past Surgical History:   Procedure Laterality Date   • EYE SURGERY     • GALLBLADDER SURGERY     • HYSTERECTOMY     • SINUS SURGERY       Social History     Tobacco Use   • Smoking status: Never Smoker   • Smokeless tobacco: Never Used   Substance Use Topics   • Alcohol use: Yes     Comment: rarely   • Drug use: No     Family History   Problem Relation Age of Onset   • Colon cancer Mother    • Hypertension Mother    • Brain cancer Mother    • Diabetes Father    • Colon polyps Father    • Gout Father    • Heart disease Sister    • Hypertension Sister    • Gout Sister    • Hypertension Brother    • Breast cancer Cousin         40/50   • Ovarian cancer Neg Hx    • Uterine cancer Neg Hx      Current Outpatient Medications on File Prior to Visit   Medication Sig Dispense Refill   • calcium carbonate (OS-CANDELARIA) 600 MG tablet Take 600 mg by mouth Daily.     • cetirizine (zyrTEC) 10 MG tablet Take 10 mg by mouth Daily.     • estradiol (VAGIFEM) 10 MCG tablet vaginal tablet Place one tablet in the vagina daily for 14 days, then twice weekly 14 tablet 3   • glimepiride (AMARYL) 1 MG tablet      • Lactobacillus (ACIDOPHILUS PO) Take  by mouth.     • lisinopril (PRINIVIL,ZESTRIL) 20 MG tablet Take 20 mg by mouth Daily.     • lovastatin (MEVACOR) 40 MG tablet Take 40 mg by mouth Every Night.     • metFORMIN (GLUCOPHAGE) 1000 MG tablet Take 1,000 mg by mouth 2 (Two) Times a Day With Meals.     • Multiple Vitamin (MULTI VITAMIN DAILY PO) Take  by mouth.     • Omega-3 Fatty Acids (FISH OIL) 1000 MG capsule capsule Take  by mouth Daily With Breakfast.     • omeprazole (priLOSEC) 40 MG capsule      • [DISCONTINUED] triamcinolone (KENALOG) 0.1 % cream Apply  topically to the appropriate area  as directed As Needed for Irritation. 80 g 1     No current facility-administered medications on file prior to visit.      Allergies   Allergen Reactions   • Penicillins Hives   • Sulfa Antibiotics Rash        Review of Systems   Constitutional: Negative for activity change, appetite change, chills, fatigue, fever and unexpected weight change.   HENT: Negative for congestion, nosebleeds and sore throat.    Eyes: Negative for visual disturbance.   Respiratory: Negative for cough, chest tightness and shortness of breath.    Cardiovascular: Negative for chest pain and palpitations.   Gastrointestinal: Negative for abdominal pain, constipation, diarrhea, nausea and vomiting.   Endocrine: Negative for polyuria.   Genitourinary: Positive for dysuria, vaginal discharge and vaginal pain. Negative for difficulty urinating, dyspareunia, frequency, genital sores, hematuria, menstrual problem, pelvic pain, urgency and vaginal bleeding.   Musculoskeletal: Negative for arthralgias and joint swelling.   Skin: Negative for color change and rash.   Neurological: Negative for dizziness, light-headedness and headaches.   Hematological: Does not bruise/bleed easily.   Psychiatric/Behavioral: Negative for agitation and dysphoric mood. The patient is not nervous/anxious.        Objective    /93   Pulse 87   Wt 69.4 kg (153 lb)   BMI 31.99 kg/m²    Physical Exam   Constitutional: She is oriented to person, place, and time. She appears well-developed and well-nourished. No distress.   HENT:   Head: Normocephalic and atraumatic.   Neck: No tracheal deviation present. No thyromegaly present.   Cardiovascular: Normal rate, regular rhythm and normal heart sounds.  Exam reveals no gallop and no friction rub.    No murmur heard.  Pulmonary/Chest: Effort normal and breath sounds normal. No respiratory distress. She has no wheezes. She has no rales. She exhibits no tenderness. Right breast exhibits no inverted nipple, no mass, no nipple  discharge, no skin change and no tenderness. Left breast exhibits no inverted nipple, no mass, no nipple discharge, no skin change and no tenderness.   Abdominal: Soft. She exhibits no distension and no mass. There is no tenderness.   Genitourinary: No labial fusion. There is no rash, lesion or injury on the right labia. There is no rash, lesion or injury on the left labia. Right adnexum displays no mass, no tenderness and no fullness. Left adnexum displays no mass, no tenderness and no fullness. No tenderness or bleeding in the vagina. No vaginal discharge found.   Genitourinary Comments: Surgically absent uterus, cervix  Cuff intact   Musculoskeletal: Normal range of motion. She exhibits no edema or deformity.   Lymphadenopathy:     She has no cervical adenopathy.   Neurological: She is alert and oriented to person, place, and time.   Skin: Skin is warm and dry. No rash noted. She is not diaphoretic.   Psychiatric: She has a normal mood and affect. Her behavior is normal. Judgment and thought content normal.         Assessment/Plan   There are no diagnoses linked to this encounter.  Well woman counseling/screening:    Cervical cancer screening:    Reports remote h/o cervical dysplasia   S/p hysterectomy for infection    Screening guidelines discussed with patient  Breast cancer screening:    Mammogram UTD   Breast self awareness encouraged  Colonscopy:   Due in 2020  DXA   Screening at 64 (next year), low risk FRAX  Family history    does not demonstrate need for genetics referral   Healthy lifestyle counseling:   Patient was counseled on    Body mass index is 31.99 kg/m².         BMI Counseling  Her BMI is classified as BMI 30.0-34.9        Classification: class I obesity  Is Cori Lozano ready to make a healthy lifestyle change today? Yes - lost 2lb since last annual  Patient has tried exercise and dietary change  Today, the course of action is: lose 10-15lb by next annual

## 2019-06-21 ENCOUNTER — TELEPHONE (OUTPATIENT)
Dept: OBSTETRICS AND GYNECOLOGY | Facility: CLINIC | Age: 64
End: 2019-06-21

## 2019-06-21 NOTE — TELEPHONE ENCOUNTER
PT called, states she has a vaginal irritation, red spot on vaginal area. Requesting to be seen next week at [facility]. No available appts. Please advise. PT # [phone]  Thank you.

## 2019-06-21 NOTE — TELEPHONE ENCOUNTER
I will see her on Wednesday as an add on but please explain that there may be an additional wait.  Otherwise, she can come early (12:45/12:30) to [facility] and I will see her there on Monday as an overbook. Thanks!

## 2019-06-24 ENCOUNTER — OFFICE VISIT (OUTPATIENT)
Dept: OBSTETRICS AND GYNECOLOGY | Facility: CLINIC | Age: 64
End: 2019-06-24

## 2019-06-24 VITALS
BODY MASS INDEX: 33.17 KG/M2 | HEART RATE: 84 BPM | HEIGHT: 58 IN | SYSTOLIC BLOOD PRESSURE: 141 MMHG | DIASTOLIC BLOOD PRESSURE: 90 MMHG | WEIGHT: 158 LBS

## 2019-06-24 DIAGNOSIS — N95.2 VAGINAL ATROPHY: Primary | ICD-10-CM

## 2019-06-24 PROCEDURE — 99213 OFFICE O/P EST LOW 20 MIN: CPT | Performed by: OBSTETRICS & GYNECOLOGY

## 2019-06-24 RX ORDER — ESTRADIOL 10 UG/1
1 INSERT VAGINAL 2 TIMES WEEKLY
Qty: 24 TABLET | Refills: 1 | Status: SHIPPED | OUTPATIENT
Start: 2019-06-24 | End: 2019-09-20

## 2019-06-24 RX ORDER — ASPIRIN 81 MG
TABLET, DELAYED RELEASE (ENTERIC COATED) ORAL
COMMUNITY

## 2019-06-24 RX ORDER — PNV NO.95/FERROUS FUM/FOLIC AC 28MG-0.8MG
TABLET ORAL
COMMUNITY

## 2019-06-24 NOTE — PROGRESS NOTES
SUBJECTIVE:   Chief Complaint   Patient presents with   • Vaginal Itching     pt reports a red spot on the inside of the vagina, which states she feels it is tender and it itches.        Cori Lozano is a 63 y.o.  who presents for vulvar pain and itching x 1 week.  Has not been using any topical medications. Stopped using vagifem and triamcinolone. Had not had symptoms in months.  Denies any bleeding, recent sexual intercourse, change in detergent/soap or other unusual environmental exposure. Denies internal vaginal itching, discharge.  Denies fever/chills.      Past Medical History:   Diagnosis Date   • Lynn esophagus    • Diabetes (CMS/HCC)    • GERD (gastroesophageal reflux disease)    • Hyperlipidemia    • Hypertension    • Osteoarthritis    • Seasonal allergies      Past Surgical History:   Procedure Laterality Date   • EYE SURGERY     • GALLBLADDER SURGERY     • HYSTERECTOMY     • SINUS SURGERY       OB History    Para Term  AB Living   1 1   1   1   SAB TAB Ectopic Molar Multiple Live Births                    # Outcome Date GA Lbr Joe/2nd Weight Sex Delivery Anes PTL Lv   1      F Vag-Spont            Social History     Tobacco Use   • Smoking status: Never Smoker   • Smokeless tobacco: Never Used   Substance Use Topics   • Alcohol use: Yes     Comment: rarely   • Drug use: No     Family History   Problem Relation Age of Onset   • Colon cancer Mother         40's/50's   • Hypertension Mother    • Brain cancer Mother    • Diabetes Father    • Colon polyps Father    • Gout Father    • Heart disease Sister    • Hypertension Sister    • Gout Sister    • Hypertension Brother    • Breast cancer Cousin         40/50   • Ovarian cancer Neg Hx    • Uterine cancer Neg Hx    • Pulmonary embolism Neg Hx    • Deep vein thrombosis Neg Hx      Current Outpatient Medications on File Prior to Visit   Medication Sig Dispense Refill   • calcium carbonate (OS-CANDELARIA) 600 MG tablet Take 600 mg by  "mouth Daily.     • cetirizine (zyrTEC) 10 MG tablet Take 10 mg by mouth Daily.     • Docusate Sodium (STOOL SOFTENER) 100 MG capsule      • Ferrous Sulfate (IRON) 325 (65 Fe) MG tablet      • glimepiride (AMARYL) 1 MG tablet      • Lactobacillus (ACIDOPHILUS PO) Take  by mouth.     • lisinopril (PRINIVIL,ZESTRIL) 20 MG tablet Take 20 mg by mouth Daily.     • metFORMIN (GLUCOPHAGE) 1000 MG tablet Take 1,000 mg by mouth 2 (Two) Times a Day With Meals.     • Multiple Vitamin (MULTI VITAMIN DAILY PO) Take  by mouth.     • Omega-3 Fatty Acids (FISH OIL) 1000 MG capsule capsule Take  by mouth Daily With Breakfast.     • omeprazole (priLOSEC) 40 MG capsule      • [DISCONTINUED] estradiol (VAGIFEM) 10 MCG tablet vaginal tablet Place one tablet in the vagina daily for 14 days, then twice weekly 14 tablet 3   • [DISCONTINUED] lovastatin (MEVACOR) 40 MG tablet Take 40 mg by mouth Every Night.       No current facility-administered medications on file prior to visit.      Allergies   Allergen Reactions   • Penicillins Hives   • Sulfa Antibiotics Rash        Review of Systems   Constitutional: Negative for activity change, appetite change, fatigue, fever and unexpected weight change.   Gastrointestinal: Negative for abdominal pain, nausea and vomiting.   Genitourinary: Negative for menstrual problem, vaginal bleeding, vaginal discharge and vaginal pain ( itching present).   Hematological: Does not bruise/bleed easily.   Psychiatric/Behavioral: Negative for agitation.         OBJECTIVE:   Vitals:    06/24/19 1250   BP: 141/90   Pulse: 84   Weight: 71.7 kg (158 lb)   Height: 147.3 cm (57.99\")      Physical Exam   Constitutional: She is oriented to person, place, and time. She appears well-developed and well-nourished. No distress.   HENT:   Head: Normocephalic and atraumatic.   Eyes: EOM are normal. No scleral icterus.   Neck: Normal range of motion.   Cardiovascular: Normal rate and regular rhythm.   Pulmonary/Chest: Effort " normal. No respiratory distress.   Abdominal: Soft. She exhibits no distension.   Genitourinary: Uterus normal and cervix normal. There is no rash, tenderness, lesion, injury or Bartholin's cyst on the right labia. There is no rash, tenderness, lesion, injury or Bartholin's cyst on the left labia. Cervix does not exhibit motion tenderness. Right adnexum displays no mass, no tenderness and no fullness. Right adnexum is present.Left adnexum displays no mass, no tenderness and no fullness. Left adnexum is present.Vagina exhibits no lesion and no loss of rugae. No erythema, tenderness or bleeding in the vagina. No foreign body in the vagina. No signs of injury around the vagina. No vaginal discharge found.   Genitourinary Comments: Atrophy noted   Musculoskeletal: Normal range of motion.   Neurological: She is alert and oriented to person, place, and time.   Skin: Skin is warm and dry. No rash noted. She is not diaphoretic. No erythema.   Psychiatric: She has a normal mood and affect. Her behavior is normal. Judgment and thought content normal.       ASSESSMENT/PLAN:     ICD-10-CM ICD-9-CM   1. Vaginal atrophy N95.2 627.3       Exam does not demonstrate ulceration, erythema or discrete lesion.  She does appear to have generalized atrophy and states that she stop using her vaginal estrogen several weeks ago.  I reviewed that given the prolonged course of her vaginal irritation/itching, I would recommend a biopsy. She agrees to biopsy if the symptoms persist more than 2 weeks.  She is aware that a biopsy is the only way to diagnose a premalignant or malignant lesion and delay in the diagnosis can result in progression.  No further questions at this time.  Refill of vagifem sent.      Return in about 2 weeks (around 7/8/2019) for biopsy if no improvement.

## 2019-07-08 ENCOUNTER — TELEPHONE (OUTPATIENT)
Dept: OBSTETRICS AND GYNECOLOGY | Facility: CLINIC | Age: 64
End: 2019-07-08

## 2019-07-08 NOTE — TELEPHONE ENCOUNTER
Patient called today said that she was seen two weeks ago for irritation and itching in the vaginal area and that she was to return if the problems did no reside and she says that they have not and that biopsy was discussed so she wanted to come in for that. There is nothing on the schedule till 7/31 patient says she believes you wanted to see her sooner then this.

## 2019-07-17 ENCOUNTER — PROCEDURE VISIT (OUTPATIENT)
Dept: OBSTETRICS AND GYNECOLOGY | Facility: CLINIC | Age: 64
End: 2019-07-17

## 2019-07-17 VITALS
BODY MASS INDEX: 32.75 KG/M2 | HEIGHT: 58 IN | DIASTOLIC BLOOD PRESSURE: 73 MMHG | SYSTOLIC BLOOD PRESSURE: 118 MMHG | HEART RATE: 86 BPM | WEIGHT: 156 LBS

## 2019-07-17 DIAGNOSIS — L29.2 VULVAR ITCHING: Primary | ICD-10-CM

## 2019-07-17 PROCEDURE — 56605 BIOPSY OF VULVA/PERINEUM: CPT | Performed by: OBSTETRICS & GYNECOLOGY

## 2019-07-17 NOTE — PROGRESS NOTES
Vulvar Biopsy Procedure Note    Indications: Vulvar itching and redness, refractory to previous treatments    Procedure Details:  Area was prepped with hibiclens.    Local injection of 1% lidocaine, 1.5mL was given at biopsy site(s).    Lesion Location: four o'clock on left vulva    Biopsy taken with: 4mm punch biopsy    Hemostatis obtained with: 3-0 monocryl single suture    Comments:  Patient tolerated procedure well.  Call with results if has not heard from us in 7 days

## 2019-07-25 LAB
DX ICD CODE: NORMAL
PATH REPORT.FINAL DX SPEC: NORMAL
PATH REPORT.GROSS SPEC: NORMAL
PATH REPORT.MICROSCOPIC SPEC OTHER STN: NORMAL
PATH REPORT.SITE OF ORIGIN SPEC: NORMAL
PATHOLOGIST NAME: NORMAL
PAYMENT PROCEDURE: NORMAL

## 2019-07-26 ENCOUNTER — TELEPHONE (OUTPATIENT)
Dept: OBSTETRICS AND GYNECOLOGY | Facility: CLINIC | Age: 64
End: 2019-07-26

## 2019-07-26 RX ORDER — TRIAMCINOLONE ACETONIDE 5 MG/G
OINTMENT TOPICAL
Qty: 15 G | Refills: 0 | Status: SHIPPED | OUTPATIENT
Start: 2019-07-26 | End: 2019-09-20

## 2019-07-26 NOTE — TELEPHONE ENCOUNTER
Please let patient know that her biopsy showed inflammation which could be c/w lichen sclerosus or reactive changes to an irritant/allergen.  To see if her symptoms improve, I would recommend a topical steroid applied nightly for 4 weeks, then every other night for four weeks, then 2-3 times per week and a repeat exam in 2 months.  Come in sooner with worsening symptoms, no change, bleeding or other concern.thanks!

## 2019-07-30 NOTE — TELEPHONE ENCOUNTER
07/30/19  Called pt and was informed of bx results. Pt was also provided with instructions on the streroid ointment. Pt is aware to repeat exam in two months, come in sooner if no change, or symptoms worsening

## 2019-09-03 ENCOUNTER — TELEPHONE (OUTPATIENT)
Dept: OBSTETRICS AND GYNECOLOGY | Facility: CLINIC | Age: 64
End: 2019-09-03

## 2019-09-03 RX ORDER — CLOBETASOL PROPIONATE 0.5 MG/G
OINTMENT TOPICAL NIGHTLY
Qty: 30 G | Refills: 0 | Status: SHIPPED | OUTPATIENT
Start: 2019-09-03 | End: 2019-09-20

## 2019-09-20 ENCOUNTER — OFFICE VISIT (OUTPATIENT)
Dept: OBSTETRICS AND GYNECOLOGY | Facility: CLINIC | Age: 64
End: 2019-09-20

## 2019-09-20 VITALS
DIASTOLIC BLOOD PRESSURE: 82 MMHG | WEIGHT: 156 LBS | HEART RATE: 86 BPM | SYSTOLIC BLOOD PRESSURE: 157 MMHG | HEIGHT: 58 IN | BODY MASS INDEX: 32.75 KG/M2

## 2019-09-20 DIAGNOSIS — L28.0 LICHEN: Primary | ICD-10-CM

## 2019-09-20 PROCEDURE — 99213 OFFICE O/P EST LOW 20 MIN: CPT | Performed by: OBSTETRICS & GYNECOLOGY

## 2019-09-20 NOTE — PROGRESS NOTES
"SUBJECTIVE:   Chief Complaint   Patient presents with   • vaginal atrophy     pt reports feeling raw in her vaginal area         Cori Lozano is a 63 y.o.  who presents for irritation, raw feeling in vaginal area.  She tried the triamcinolone for every day for a month.  She then did it every other day for a week before calling with worsening symptoms and changing to Kenalog for 14 days.  Stopped on  and feels that it is somewhat improved.  The spot is primarily at the bottom of the vagina on the left side. Started feeling this way after the \"larger area\" cleared up about a month ago.      Had biopsy on  that showed spongiosis, possible lichen changes.    Has a dermatologist, Dr. Naqvi but has not seen him in a while.     Past Medical History:   Diagnosis Date   • Lynn esophagus    • Diabetes (CMS/HCC)    • GERD (gastroesophageal reflux disease)    • Hyperlipidemia    • Hypertension    • Osteoarthritis    • Seasonal allergies      Past Surgical History:   Procedure Laterality Date   • EYE SURGERY     • GALLBLADDER SURGERY     • HYSTERECTOMY     • SINUS SURGERY       OB History    Para Term  AB Living   1 1   1   1   SAB TAB Ectopic Molar Multiple Live Births                    # Outcome Date GA Lbr Joe/2nd Weight Sex Delivery Anes PTL Lv   1      F Vag-Spont            Social History     Tobacco Use   • Smoking status: Never Smoker   • Smokeless tobacco: Never Used   Substance Use Topics   • Alcohol use: Yes     Comment: rarely   • Drug use: No     Family History   Problem Relation Age of Onset   • Colon cancer Mother         40's/50's   • Hypertension Mother    • Brain cancer Mother    • Diabetes Father    • Colon polyps Father    • Gout Father    • Heart disease Sister    • Hypertension Sister    • Gout Sister    • Hypertension Brother    • Breast cancer Cousin         40/50   • Ovarian cancer Neg Hx    • Uterine cancer Neg Hx    • Pulmonary embolism Neg Hx    • Deep " vein thrombosis Neg Hx      Current Outpatient Medications on File Prior to Visit   Medication Sig Dispense Refill   • calcium carb-cholecalciferol (CALCIUM 600+D) 600-800 MG-UNIT tablet      • cetirizine (zyrTEC) 10 MG tablet Take 10 mg by mouth Daily.     • Docusate Sodium (STOOL SOFTENER) 100 MG capsule      • Ferrous Sulfate (IRON) 325 (65 Fe) MG tablet      • glimepiride (AMARYL) 1 MG tablet      • lisinopril (PRINIVIL,ZESTRIL) 20 MG tablet Take 20 mg by mouth Daily.     • metFORMIN (GLUCOPHAGE) 1000 MG tablet Take 1,000 mg by mouth 2 (Two) Times a Day With Meals.     • Multiple Vitamin (MULTI VITAMIN DAILY PO) Take  by mouth.     • Omega-3 Fatty Acids (FISH OIL) 1000 MG capsule capsule Take  by mouth Daily With Breakfast.     • omeprazole (priLOSEC) 40 MG capsule      • [DISCONTINUED] clobetasol (TEMOVATE) 0.05 % ointment Apply  topically to the appropriate area as directed Every Night. 30 g 0   • [DISCONTINUED] estradiol (VAGIFEM) 10 MCG tablet vaginal tablet Insert 1 tablet into the vagina 2 (Two) Times a Week. 24 tablet 1   • [DISCONTINUED] Lactobacillus (ACIDOPHILUS PO) Take  by mouth.     • [DISCONTINUED] triamcinolone (KENALOG) 0.5 % ointment Apply 1 application topically to the appropriate area as directed Every Night for 28 days, THEN 1 application Every Other Day for 28 days. 15 g 0     No current facility-administered medications on file prior to visit.      Allergies   Allergen Reactions   • Penicillins Hives   • Sulfa Antibiotics Rash        Review of Systems   Constitutional: Negative for chills, fever and unexpected weight change.   HENT: Negative for congestion, nosebleeds and sore throat.    Eyes: Negative for visual disturbance.   Respiratory: Negative for cough, chest tightness and shortness of breath.    Cardiovascular: Negative for chest pain and palpitations.   Gastrointestinal: Negative for abdominal pain, constipation, diarrhea, nausea and vomiting.   Endocrine: Negative for polyuria.  "  Genitourinary: Negative for difficulty urinating, dyspareunia, dysuria, frequency, genital sores, hematuria, menstrual problem, pelvic pain, urgency, vaginal bleeding, vaginal discharge and vaginal pain.   Musculoskeletal: Negative for arthralgias and joint swelling.   Skin: Positive for wound. Negative for color change.   Neurological: Negative for dizziness, light-headedness and headaches.   Hematological: Does not bruise/bleed easily.   Psychiatric/Behavioral: Negative for dysphoric mood. The patient is not nervous/anxious.          OBJECTIVE:   Vitals:    09/20/19 1004 09/20/19 1015   BP: 159/94 157/82   Pulse: 90 86   Weight: 70.8 kg (156 lb)    Height: 147.3 cm (57.99\")       Physical Exam   Constitutional: She is oriented to person, place, and time. She appears well-developed and well-nourished. No distress.   HENT:   Head: Normocephalic and atraumatic.   Eyes: EOM are normal. No scleral icterus.   Neck: Normal range of motion.   Cardiovascular: Normal rate and regular rhythm.   Pulmonary/Chest: Effort normal. No respiratory distress.   Abdominal: Soft. She exhibits no distension.   Genitourinary:         Musculoskeletal: Normal range of motion.   Neurological: She is alert and oriented to person, place, and time.   Skin: Skin is warm and dry. No rash noted. She is not diaphoretic. No erythema.   Psychiatric: She has a normal mood and affect. Her behavior is normal. Judgment and thought content normal.       ASSESSMENT/PLAN:     ICD-10-CM ICD-9-CM   1. Lichen L28.0 697.9   Overall exam appears improved.  I do see the area that is irritating her is slightly more red and appears a little excoriated.  Reviewed options (derm referral, continue clobetasol, repeat biopsy). Pt will cont cream for 2 more weeks and if no improvement, call her dermatologist. If worsening, recommend sooner follow up.  Otherwise re-check in 2-3 months    Return in about 3 months (around 12/20/2019).              "

## 2019-10-07 ENCOUNTER — TELEPHONE (OUTPATIENT)
Dept: OBSTETRICS AND GYNECOLOGY | Facility: CLINIC | Age: 64
End: 2019-10-07

## 2019-10-07 NOTE — TELEPHONE ENCOUNTER
Patient has been treated the last couple of week for a spot in the vaginal area. Was told to call back if not better. Pt Ph [phone]

## 2019-10-07 NOTE — TELEPHONE ENCOUNTER
Patient will likely need a repeat biopsy; alternatively we can refer her to dermatology for a second opinion.  Thanks!

## 2019-10-08 NOTE — TELEPHONE ENCOUNTER
10/08/19  Called to informed of Dr. Vila's msg below, no naswer left msg to return call.    -pt returned call and has been schedule for 10/22/19 at 11:00am at [facility]

## 2019-10-08 NOTE — TELEPHONE ENCOUNTER
Scheduled pt for bx on 10/30/19.  She would like a sooner appt, but is ok to keep this one if you do not think it is too far out.  Please advise.     Pt # [phone]

## 2019-10-22 ENCOUNTER — PROCEDURE VISIT (OUTPATIENT)
Dept: OBSTETRICS AND GYNECOLOGY | Facility: CLINIC | Age: 64
End: 2019-10-22

## 2019-10-22 VITALS
SYSTOLIC BLOOD PRESSURE: 142 MMHG | WEIGHT: 158 LBS | BODY MASS INDEX: 33.17 KG/M2 | HEIGHT: 58 IN | DIASTOLIC BLOOD PRESSURE: 82 MMHG | HEART RATE: 84 BPM

## 2019-10-22 DIAGNOSIS — N95.2 VAGINAL ATROPHY: Primary | ICD-10-CM

## 2019-10-22 RX ORDER — CLOBETASOL PROPIONATE 0.5 MG/G
OINTMENT TOPICAL
COMMUNITY
Start: 2019-09-23 | End: 2019-10-30 | Stop reason: SDUPTHER

## 2019-10-22 RX ORDER — ESTRADIOL 10 UG/1
INSERT VAGINAL
Qty: 14 TABLET | Refills: 7 | Status: SHIPPED | OUTPATIENT
Start: 2019-10-22 | End: 2019-12-17

## 2019-10-22 RX ORDER — INFLUENZA A VIRUS A/MICHIGAN/45/2015 X-275 (H1N1) ANTIGEN (FORMALDEHYDE INACTIVATED), INFLUENZA A VIRUS A/SINGAPORE/INFIMH-16-0019/2016 IVR-186 (H3N2) ANTIGEN (FORMALDEHYDE INACTIVATED), INFLUENZA B VIRUS B/PHUKET/3073/2013 ANTIGEN (FORMALDEHYDE INACTIVATED), AND INFLUENZA B VIRUS B/MARYLAND/15/2016 BX-69A ANTIGEN (FORMALDEHYDE INACTIVATED) 15; 15; 15; 15 UG/.5ML; UG/.5ML; UG/.5ML; UG/.5ML
INJECTION, SUSPENSION INTRAMUSCULAR
COMMUNITY
Start: 2019-09-24 | End: 2023-02-21

## 2019-10-22 NOTE — PROGRESS NOTES
SUBJECTIVE:   Chief Complaint   Patient presents with   • Procedure     pt is here for a vulvar biopsy        Cori Lozano is a 63 y.o.  who presents for follow up of vulvar complaints. Reports the steroid cream she used for two weeks got rid of the itching. She still has some redness down there.  Denies any ulceration, bleeding.  Uses some [employer] perfumed soap and thinks that may be the causative factor.  Has been sexually active. Noticed worse dryness in the last two weeks.     Past Medical History:   Diagnosis Date   • Lynn esophagus    • Diabetes (CMS/HCC)    • GERD (gastroesophageal reflux disease)    • Hyperlipidemia    • Hypertension    • Osteoarthritis    • Seasonal allergies      Past Surgical History:   Procedure Laterality Date   • EYE SURGERY     • GALLBLADDER SURGERY     • HYSTERECTOMY     • SINUS SURGERY       OB History    Para Term  AB Living   1    1   SAB TAB Ectopic Molar Multiple Live Births                    # Outcome Date GA Lbr Joe/2nd Weight Sex Delivery Anes PTL Lv   1      F Vag-Spont            Social History     Tobacco Use   • Smoking status: Never Smoker   • Smokeless tobacco: Never Used   Substance Use Topics   • Alcohol use: Yes     Comment: rarely   • Drug use: No     Family History   Problem Relation Age of Onset   • Colon cancer Mother         40's/50's   • Hypertension Mother    • Brain cancer Mother    • Diabetes Father    • Colon polyps Father    • Gout Father    • Heart disease Sister    • Hypertension Sister    • Gout Sister    • Hypertension Brother    • Breast cancer Cousin         40/50   • Ovarian cancer Neg Hx    • Uterine cancer Neg Hx    • Pulmonary embolism Neg Hx    • Deep vein thrombosis Neg Hx      Current Outpatient Medications on File Prior to Visit   Medication Sig Dispense Refill   • calcium carb-cholecalciferol (CALCIUM 600+D) 600-800 MG-UNIT tablet      • cetirizine (zyrTEC) 10 MG tablet Take 10 mg by mouth Daily.     •  "clobetasol (TEMOVATE) 0.05 % ointment      • Docusate Sodium (STOOL SOFTENER) 100 MG capsule      • Ferrous Sulfate (IRON) 325 (65 Fe) MG tablet      • FLUZONE QUADRIVALENT 0.5 ML suspension prefilled syringe injection      • glimepiride (AMARYL) 1 MG tablet      • lisinopril (PRINIVIL,ZESTRIL) 20 MG tablet Take 20 mg by mouth Daily.     • metFORMIN (GLUCOPHAGE) 1000 MG tablet Take 1,000 mg by mouth 2 (Two) Times a Day With Meals.     • Multiple Vitamin (MULTI VITAMIN DAILY PO) Take  by mouth.     • Omega-3 Fatty Acids (FISH OIL) 1000 MG capsule capsule Take  by mouth Daily With Breakfast.     • omeprazole (priLOSEC) 40 MG capsule        No current facility-administered medications on file prior to visit.      Allergies   Allergen Reactions   • Penicillins Hives   • Sulfa Antibiotics Rash        Review of Systems   Constitutional: Negative.    HENT: Negative.    Respiratory: Negative.    Cardiovascular: Negative.    Gastrointestinal: Negative.    Endocrine: Negative.    Genitourinary: Negative.    Musculoskeletal: Negative.    Skin: Negative.    Neurological: Negative.    Psychiatric/Behavioral: Negative.          OBJECTIVE:   Vitals:    10/22/19 1132   BP: 142/82   Pulse: 84   Weight: 71.7 kg (158 lb)   Height: 147.3 cm (57.99\")      Physical Exam   Constitutional: She is oriented to person, place, and time. She appears well-developed and well-nourished. No distress.   HENT:   Head: Normocephalic and atraumatic.   Eyes: EOM are normal. No scleral icterus.   Neck: Normal range of motion.   Cardiovascular: Normal rate and regular rhythm.   Pulmonary/Chest: Effort normal. No respiratory distress.   Abdominal: Soft. She exhibits no distension.   Genitourinary: There is no rash, tenderness, lesion or injury on the right labia. There is no rash, tenderness, lesion or injury on the left labia. Vagina exhibits loss of rugae.   Musculoskeletal: Normal range of motion.   Neurological: She is alert and oriented to person, " place, and time.   Skin: Skin is warm and dry. No rash noted. She is not diaphoretic. No erythema.   Psychiatric: She has a normal mood and affect. Her behavior is normal. Judgment and thought content normal.       ASSESSMENT/PLAN:     ICD-10-CM ICD-9-CM   1. Vaginal atrophy N95.2 627.3     I reviewed the patient's exam with her.  I do not see any changes concerning for worsening lichenoid disease, no ulcerations, no white plaques.  Her exam seems consistent with vaginal atrophy.  We discussed the options including repeat biopsy today which can be a painful procedure, referral to dermatology, expectant management, trial of estrogen to see if this improves the redness.  Patient would like a repeat trial of Vagifem.  She has no contraindications.  I recommended that if she no skin changes arising we could consider re-examination with dermatology for second opinion.  Her itching is overall gotten better which is reassuring.  Follow-up for annual exam or as needed

## 2019-10-28 ENCOUNTER — TELEPHONE (OUTPATIENT)
Dept: OBSTETRICS AND GYNECOLOGY | Facility: CLINIC | Age: 64
End: 2019-10-28

## 2019-10-28 NOTE — TELEPHONE ENCOUNTER
Fransisca:   I can send Imvexxy (vaginal suppository) to a pharmacy in [place], [place] that usually has an affordable out of pocket cost to ship or we can send a cream of vaginal estrogen and see if that is better covered. Thanks!

## 2019-10-28 NOTE — TELEPHONE ENCOUNTER
Good Morning,     This patient would like to speak with you about medicine (yuvafem), she starts medicare on Nov 1st, and it will not be covered, can she use generic, or is there something else you want to switch her to?    Thank you   [phone]

## 2019-10-28 NOTE — TELEPHONE ENCOUNTER
10/28/19  Pt states she prefers to call medicare to find out the cost of Imvexxy before prescribing and will return the call by the end of the day or call tomorrow.

## 2019-10-30 RX ORDER — CLOBETASOL PROPIONATE 0.5 MG/G
OINTMENT TOPICAL
Qty: 30 G | Refills: 0 | Status: SHIPPED | OUTPATIENT
Start: 2019-10-30 | End: 2022-03-28

## 2019-10-30 NOTE — TELEPHONE ENCOUNTER
I recommend taking away any perfumed or dyed soaps or ointment in the vaginal area. Would she like to try an alternative vaginal estrogen such as premarin or estrace?  If not, she can continue the clobetasol 0.05% ointment.  Rx sent

## 2019-10-30 NOTE — TELEPHONE ENCOUNTER
Pt said Medicare told her there is nothing comparable to Imvexxy in tiers 1 or 2 that would be covered. She asks do you want her to continue on the triamcinolone (KENALOG) 0.5 % ointment or the triamcinolone (KENALOG) 0.1 % cream? If so can you please send in refills?

## 2019-10-31 NOTE — TELEPHONE ENCOUNTER
Pt is aware. She wasn't for sure whether she wanted to use the clobetasol, but she is aware it has been sent to her pharmacy if she would like to pick it up.

## 2019-12-17 ENCOUNTER — OFFICE VISIT (OUTPATIENT)
Dept: OBSTETRICS AND GYNECOLOGY | Facility: CLINIC | Age: 64
End: 2019-12-17

## 2019-12-17 VITALS
HEART RATE: 77 BPM | BODY MASS INDEX: 32.12 KG/M2 | SYSTOLIC BLOOD PRESSURE: 130 MMHG | DIASTOLIC BLOOD PRESSURE: 79 MMHG | HEIGHT: 58 IN | WEIGHT: 153 LBS

## 2019-12-17 DIAGNOSIS — N90.4 LICHEN SCLEROSUS OF FEMALE GENITALIA: Primary | ICD-10-CM

## 2019-12-17 PROCEDURE — 99212 OFFICE O/P EST SF 10 MIN: CPT | Performed by: OBSTETRICS & GYNECOLOGY

## 2019-12-17 NOTE — PROGRESS NOTES
SUBJECTIVE:   Chief Complaint   Patient presents with   • vaginal atrophy     pt states she is following up on vaginal dryness.         Cori Lozano is a 64 y.o.  who presents for follow up of lichen sclerosus.  Doing much better .Using cream 2 times weekly, no bleeding or itching.    Past Medical History:   Diagnosis Date   • Lynn esophagus    • Diabetes (CMS/HCC)    • GERD (gastroesophageal reflux disease)    • Hyperlipidemia    • Hypertension    • Osteoarthritis    • Seasonal allergies      Past Surgical History:   Procedure Laterality Date   • EYE SURGERY     • GALLBLADDER SURGERY     • HYSTERECTOMY     • SINUS SURGERY       OB History    Para Term  AB Living   1 1   1   1   SAB TAB Ectopic Molar Multiple Live Births                    # Outcome Date GA Lbr Joe/2nd Weight Sex Delivery Anes PTL Lv   1      F Vag-Spont         Social History     Tobacco Use   • Smoking status: Never Smoker   • Smokeless tobacco: Never Used   Substance Use Topics   • Alcohol use: Yes     Comment: rarely   • Drug use: No     Family History   Problem Relation Age of Onset   • Colon cancer Mother         40's/50's   • Hypertension Mother    • Brain cancer Mother    • Diabetes Father    • Colon polyps Father    • Gout Father    • Heart disease Sister    • Hypertension Sister    • Gout Sister    • Hypertension Brother    • Breast cancer Cousin         40/50   • Ovarian cancer Neg Hx    • Uterine cancer Neg Hx    • Pulmonary embolism Neg Hx    • Deep vein thrombosis Neg Hx      Current Outpatient Medications on File Prior to Visit   Medication Sig Dispense Refill   • calcium carb-cholecalciferol (CALCIUM 600+D) 600-800 MG-UNIT tablet      • cetirizine (zyrTEC) 10 MG tablet Take 10 mg by mouth Daily.     • clobetasol (TEMOVATE) 0.05 % ointment Apply pea size amount every other day x 30days then 2x weekly x 30 days 30 g 0   • Docusate Sodium (STOOL SOFTENER) 100 MG capsule      • Ferrous Sulfate (IRON) 325  "(65 Fe) MG tablet      • FLUZONE QUADRIVALENT 0.5 ML suspension prefilled syringe injection      • glimepiride (AMARYL) 1 MG tablet      • lisinopril (PRINIVIL,ZESTRIL) 20 MG tablet Take 20 mg by mouth Daily.     • metFORMIN (GLUCOPHAGE) 1000 MG tablet Take 1,000 mg by mouth 2 (Two) Times a Day With Meals.     • Multiple Vitamin (MULTI VITAMIN DAILY PO) Take  by mouth.     • Omega-3 Fatty Acids (FISH OIL) 1000 MG capsule capsule Take  by mouth Daily With Breakfast.     • omeprazole (priLOSEC) 40 MG capsule      • [DISCONTINUED] estradiol (VAGIFEM) 10 MCG tablet vaginal tablet Insert 1 tablet into the vagina Daily for 14 days, THEN 1 tablet 2 (Two) Times a Week for 340 days. 14 tablet 7     No current facility-administered medications on file prior to visit.      Allergies   Allergen Reactions   • Penicillins Hives   • Sulfa Antibiotics Rash        Review of Systems   Constitutional: Negative.    Genitourinary: Negative.    Musculoskeletal: Negative.    Skin: Negative.          OBJECTIVE:   Vitals:    12/17/19 1146   BP: 130/79   Pulse: 77   Weight: 69.4 kg (153 lb)   Height: 147.3 cm (57.99\")      Physical Exam   Constitutional: She is oriented to person, place, and time. She appears well-developed and well-nourished. No distress.   HENT:   Head: Normocephalic and atraumatic.   Eyes: EOM are normal. No scleral icterus.   Neck: Normal range of motion.   Cardiovascular: Normal rate and regular rhythm.   Pulmonary/Chest: Effort normal. No respiratory distress.   Abdominal: Soft. She exhibits no distension.   Genitourinary:         Genitourinary Comments: No hypopigmentation   Musculoskeletal: Normal range of motion.   Neurological: She is alert and oriented to person, place, and time.   Skin: Skin is warm and dry. No rash noted. She is not diaphoretic. No erythema.   Psychiatric: She has a normal mood and affect. Her behavior is normal. Judgment and thought content normal.       ASSESSMENT/PLAN:     ICD-10-CM ICD-9-CM "   1. Lichen sclerosus of female genitalia N90.4 701.0       Much improved. There is a small area of redness on left labia that is nontender, no abrasion/ulcer. Recommend follow up for annual or as needed if worsening symptoms were to arise. She will use cream on left labia specifically and if no improvement return.

## 2020-09-16 ENCOUNTER — PROCEDURE VISIT (OUTPATIENT)
Dept: OBSTETRICS AND GYNECOLOGY | Facility: CLINIC | Age: 65
End: 2020-09-16

## 2020-09-16 ENCOUNTER — OFFICE VISIT (OUTPATIENT)
Dept: OBSTETRICS AND GYNECOLOGY | Facility: CLINIC | Age: 65
End: 2020-09-16

## 2020-09-16 ENCOUNTER — APPOINTMENT (OUTPATIENT)
Dept: WOMENS IMAGING | Facility: HOSPITAL | Age: 65
End: 2020-09-16

## 2020-09-16 VITALS
BODY MASS INDEX: 32.12 KG/M2 | DIASTOLIC BLOOD PRESSURE: 91 MMHG | SYSTOLIC BLOOD PRESSURE: 156 MMHG | HEIGHT: 58 IN | HEART RATE: 112 BPM | WEIGHT: 153 LBS

## 2020-09-16 DIAGNOSIS — Z13.820 OSTEOPOROSIS SCREENING: ICD-10-CM

## 2020-09-16 DIAGNOSIS — Z01.419 WELL WOMAN EXAM WITH ROUTINE GYNECOLOGICAL EXAM: Primary | ICD-10-CM

## 2020-09-16 DIAGNOSIS — M85.9 DISORDER OF BONE DENSITY AND STRUCTURE, UNSPECIFIED: ICD-10-CM

## 2020-09-16 DIAGNOSIS — Z12.31 VISIT FOR SCREENING MAMMOGRAM: Primary | ICD-10-CM

## 2020-09-16 PROCEDURE — G0101 CA SCREEN;PELVIC/BREAST EXAM: HCPCS | Performed by: OBSTETRICS & GYNECOLOGY

## 2020-09-16 PROCEDURE — 77063 BREAST TOMOSYNTHESIS BI: CPT | Performed by: RADIOLOGY

## 2020-09-16 PROCEDURE — 77067 SCR MAMMO BI INCL CAD: CPT | Performed by: OBSTETRICS & GYNECOLOGY

## 2020-09-16 PROCEDURE — 77063 BREAST TOMOSYNTHESIS BI: CPT | Performed by: OBSTETRICS & GYNECOLOGY

## 2020-09-16 PROCEDURE — 77067 SCR MAMMO BI INCL CAD: CPT | Performed by: RADIOLOGY

## 2020-09-16 RX ORDER — ATORVASTATIN CALCIUM 10 MG/1
10 TABLET, FILM COATED ORAL DAILY
COMMUNITY
Start: 2020-07-15

## 2020-09-16 RX ORDER — INFLUENZA A VIRUS A/MICHIGAN/45/2015 X-275 (H1N1) ANTIGEN (FORMALDEHYDE INACTIVATED), INFLUENZA A VIRUS A/SINGAPORE/INFIMH-16-0019/2016 IVR-186 (H3N2) ANTIGEN (FORMALDEHYDE INACTIVATED), INFLUENZA B VIRUS B/PHUKET/3073/2013 ANTIGEN (FORMALDEHYDE INACTIVATED), AND INFLUENZA B VIRUS B/MARYLAND/15/2016 BX-69A ANTIGEN (FORMALDEHYDE INACTIVATED) 60; 60; 60; 60 UG/.7ML; UG/.7ML; UG/.7ML; UG/.7ML
INJECTION, SUSPENSION INTRAMUSCULAR
COMMUNITY
Start: 2020-08-19 | End: 2020-09-16

## 2020-09-16 NOTE — PROGRESS NOTES
Subjective   Cori Lozano is a 64 y.o. female   Chief Complaint   Patient presents with   • Annual Exam     mammo:  dexa: 2009       History of Present Illness  Cori Lozano is a 64 y.o.  who presents for an annual examination   Her itching had improved, but is back over the last couple days    Pap history:  Last pap: unsure - thinks forty years ago  Prior abnormal paps: no h/o abnormal pap smears and s/p hysterectomy for infection in   DXA:  2009 DXA normal; no risk factors - order placed  Colonoscopy:  yes,  thinks it is return in 5years (got notice in mail)  Mammogram: today  STDs  Sexually active: yes  History of STDs: no  Menopause:  Age: late 40's/early 50's  Bleeding since? no  Vasomotor symptoms: yes, mild to moderate  HRT: no, has been on Effexor but weaning off     History of physical abuse: no, History of sexual abuse: no and History of verbal/emotional abuse: no    BRCA screening:  Is patient's family or personal history significant for any of the following? no  For non-Ashkenazi [place] women:   Two first-degree relatives with breast cancer, one of whom was diagnosed at age 50 or younger   A combination of three or more first or second-degree relatives with breast cancer regardless of age at diagnosis   A combination of both breast and ovarian cancer among first and second-degree relatives   A first-degree relative with bilateral breast cancer   A combination of two or more first or second degree relatives with ovarian cancer, regardless of age at diagnosis   A first or second-degree relative with both breast and ovarian cancer at any age   History of breast cancer in a male relative   For women of Ashkenazi [place] descent:   Any first-degree relative (or two second degree relatives on the same side of the family) with breast or ovarian cancer   Recommendations from the [place] Preventive Services Task Force on who should be offered genetic testing for BRCA mutations*     Past  Medical History:   Diagnosis Date   • Lynn esophagus    • Diabetes (CMS/HCC)    • GERD (gastroesophageal reflux disease)    • Hyperlipidemia    • Hypertension    • Osteoarthritis    • Seasonal allergies      Past Surgical History:   Procedure Laterality Date   • EYE SURGERY     • GALLBLADDER SURGERY     • HYSTERECTOMY     • SINUS SURGERY       Social History     Tobacco Use   • Smoking status: Never Smoker   • Smokeless tobacco: Never Used   Substance Use Topics   • Alcohol use: Yes     Comment: rarely   • Drug use: No     Family History   Problem Relation Age of Onset   • Colon cancer Mother         40's/50's   • Hypertension Mother    • Brain cancer Mother    • Diabetes Father    • Colon polyps Father    • Gout Father    • Heart disease Sister    • Hypertension Sister    • Gout Sister    • Hypertension Brother    • Breast cancer Cousin         40/50   • Ovarian cancer Neg Hx    • Uterine cancer Neg Hx    • Pulmonary embolism Neg Hx    • Deep vein thrombosis Neg Hx      Current Outpatient Medications on File Prior to Visit   Medication Sig Dispense Refill   • Ascorbic Acid (SHAREE-C PO) Take  by mouth.     • atorvastatin (LIPITOR) 10 MG tablet Take 10 mg by mouth Daily.     • calcium carb-cholecalciferol (CALCIUM 600+D) 600-800 MG-UNIT tablet      • cetirizine (zyrTEC) 10 MG tablet Take 10 mg by mouth Daily.     • clobetasol (TEMOVATE) 0.05 % ointment Apply pea size amount every other day x 30days then 2x weekly x 30 days 30 g 0   • Docusate Sodium (STOOL SOFTENER) 100 MG capsule      • Ferrous Sulfate (IRON) 325 (65 Fe) MG tablet      • FLUZONE QUADRIVALENT 0.5 ML suspension prefilled syringe injection      • glimepiride (AMARYL) 1 MG tablet      • lisinopril (PRINIVIL,ZESTRIL) 20 MG tablet Take 20 mg by mouth Daily.     • metFORMIN (GLUCOPHAGE) 1000 MG tablet Take 1,000 mg by mouth 2 (Two) Times a Day With Meals.     • Multiple Vitamin (MULTI VITAMIN DAILY PO) Take  by mouth.     • Multiple Vitamins-Minerals  "(ZINC PO) Take  by mouth.     • Omega-3 Fatty Acids (FISH OIL) 1000 MG capsule capsule Take  by mouth Daily With Breakfast.     • omeprazole (priLOSEC) 40 MG capsule      • Probiotic Product (PROBIOTIC DAILY PO) Take  by mouth.     • SITagliptin (JANUVIA) 100 MG tablet Take 100 mg by mouth Daily.     • [DISCONTINUED] Fluzone High-Dose Quadrivalent 0.7 ML suspension prefilled syringe TO BE ADMINISTERED BY PHARMACIST FOR IMMUNIZATION       No current facility-administered medications on file prior to visit.      Allergies   Allergen Reactions   • Penicillins Hives   • Sulfa Antibiotics Rash        Review of Systems   Constitutional: Negative for activity change, appetite change, chills, fatigue, fever and unexpected weight change.   HENT: Negative for congestion, nosebleeds and sore throat.    Eyes: Negative for visual disturbance.   Respiratory: Negative for cough, chest tightness and shortness of breath.    Cardiovascular: Negative for chest pain and palpitations.   Gastrointestinal: Negative for abdominal pain, constipation, diarrhea, nausea and vomiting.   Endocrine: Negative for polyuria.   Genitourinary: Positive for dysuria, vaginal discharge and vaginal pain. Negative for difficulty urinating, dyspareunia, frequency, genital sores, hematuria, menstrual problem, pelvic pain, urgency and vaginal bleeding.   Musculoskeletal: Negative for arthralgias and joint swelling.   Skin: Negative for color change and rash.   Neurological: Negative for dizziness, light-headedness and headaches.   Hematological: Does not bruise/bleed easily.   Psychiatric/Behavioral: Negative for agitation and dysphoric mood. The patient is not nervous/anxious.        Objective    /91   Pulse 112   Ht 147.3 cm (57.99\")   Wt 69.4 kg (153 lb)   Breastfeeding No   BMI 31.99 kg/m²    Physical Exam   Constitutional: She is oriented to person, place, and time. She appears well-developed and well-nourished. No distress.   HENT:   Head: " Normocephalic and atraumatic.   Neck: No tracheal deviation present. No thyromegaly present.   Cardiovascular: Normal rate, regular rhythm and normal heart sounds.  Exam reveals no gallop and no friction rub.    No murmur heard.  Pulmonary/Chest: Effort normal and breath sounds normal. No respiratory distress. She has no wheezes. She has no rales. She exhibits no tenderness. Right breast exhibits no inverted nipple, no mass, no nipple discharge, no skin change and no tenderness. Left breast exhibits no inverted nipple, no mass, no nipple discharge, no skin change and no tenderness.   Abdominal: Soft. She exhibits no distension and no mass. There is no tenderness.   Genitourinary: No labial fusion. There is no rash, lesion or injury on the right labia. There is no rash, lesion or injury on the left labia. Right adnexum displays no mass, no tenderness and no fullness. Left adnexum displays no mass, no tenderness and no fullness. No tenderness or bleeding in the vagina. No vaginal discharge found.   Genitourinary Comments: Surgically absent uterus, cervix  Cuff intact Normal urethra and normal urethral meatus; bladder appears well supported  Musculoskeletal: Normal range of motion. She exhibits no edema or deformity.   Lymphadenopathy:     She has no cervical adenopathy.   Neurological: She is alert and oriented to person, place, and time.   Skin: Skin is warm and dry. No rash noted. She is not diaphoretic.   Psychiatric: She has a normal mood and affect. Her behavior is normal. Judgment and thought content normal.         Assessment/Plan   Cori was seen today for annual exam.    Diagnoses and all orders for this visit:    Well woman exam with routine gynecological exam    Osteoporosis screening  -     DEXA Bone Density Axial; Future    Disorder of bone density and structure, unspecified   -     DEXA Bone Density Axial; Future      Well woman counseling/screening:    Cervical cancer screening:    Reports remote h/o  cervical dysplasia   S/p hysterectomy for infection    Screening guidelines discussed with patient  Breast cancer screening:    Mammogram UTD   Breast self awareness encouraged  Colonscopy:   Due in 2022, will contact GI  DXA   DXA ordered  Family history    does not demonstrate need for genetics referral   Healthy lifestyle counseling:   Patient was counseled on    Body mass index is 31.99 kg/m².   BP elevated - no symptoms, follows with PCP         BMI Counseling  Her BMI is classified as BMI 30.0-34.9        Classification: class I obesity  Today, the course of action is: lose 10-15lb by next annual

## 2020-09-17 ENCOUNTER — TELEPHONE (OUTPATIENT)
Dept: OBSTETRICS AND GYNECOLOGY | Facility: CLINIC | Age: 65
End: 2020-09-17

## 2021-03-19 ENCOUNTER — BULK ORDERING (OUTPATIENT)
Dept: CASE MANAGEMENT | Facility: OTHER | Age: 66
End: 2021-03-19

## 2021-03-19 DIAGNOSIS — Z23 IMMUNIZATION DUE: ICD-10-CM

## 2021-09-02 ENCOUNTER — HOSPITAL ENCOUNTER (OUTPATIENT)
Dept: BONE DENSITY | Facility: HOSPITAL | Age: 66
Discharge: HOME | End: 2021-09-02
Admitting: OBSTETRICS & GYNECOLOGY

## 2021-09-02 DIAGNOSIS — Z13.820 OSTEOPOROSIS SCREENING: ICD-10-CM

## 2021-09-02 DIAGNOSIS — M85.9 DISORDER OF BONE DENSITY AND STRUCTURE, UNSPECIFIED: ICD-10-CM

## 2021-09-02 PROCEDURE — 77080 DXA BONE DENSITY AXIAL: CPT

## 2021-12-13 ENCOUNTER — OFFICE VISIT (OUTPATIENT)
Dept: OBSTETRICS AND GYNECOLOGY | Facility: CLINIC | Age: 66
End: 2021-12-13

## 2021-12-13 ENCOUNTER — APPOINTMENT (OUTPATIENT)
Dept: WOMENS IMAGING | Facility: HOSPITAL | Age: 66
End: 2021-12-13

## 2021-12-13 ENCOUNTER — PROCEDURE VISIT (OUTPATIENT)
Dept: OBSTETRICS AND GYNECOLOGY | Facility: CLINIC | Age: 66
End: 2021-12-13

## 2021-12-13 VITALS
HEART RATE: 94 BPM | DIASTOLIC BLOOD PRESSURE: 84 MMHG | SYSTOLIC BLOOD PRESSURE: 141 MMHG | WEIGHT: 144.4 LBS | HEIGHT: 59 IN | BODY MASS INDEX: 29.11 KG/M2

## 2021-12-13 DIAGNOSIS — Z12.31 VISIT FOR SCREENING MAMMOGRAM: Primary | ICD-10-CM

## 2021-12-13 DIAGNOSIS — Z01.419 WELL WOMAN EXAM WITH ROUTINE GYNECOLOGICAL EXAM: Primary | ICD-10-CM

## 2021-12-13 PROBLEM — M25.9 DISORDER OF WRIST JOINT: Status: ACTIVE | Noted: 2019-04-24

## 2021-12-13 PROBLEM — D64.9 ANEMIA: Status: ACTIVE | Noted: 2017-05-31

## 2021-12-13 PROBLEM — I67.9 CEREBROVASCULAR SMALL VESSEL DISEASE: Status: ACTIVE | Noted: 2020-07-23

## 2021-12-13 PROCEDURE — 77067 SCR MAMMO BI INCL CAD: CPT | Performed by: RADIOLOGY

## 2021-12-13 PROCEDURE — 77063 BREAST TOMOSYNTHESIS BI: CPT | Performed by: OBSTETRICS & GYNECOLOGY

## 2021-12-13 PROCEDURE — 77067 SCR MAMMO BI INCL CAD: CPT | Performed by: OBSTETRICS & GYNECOLOGY

## 2021-12-13 PROCEDURE — 77063 BREAST TOMOSYNTHESIS BI: CPT | Performed by: RADIOLOGY

## 2021-12-13 PROCEDURE — G0101 CA SCREEN;PELVIC/BREAST EXAM: HCPCS | Performed by: OBSTETRICS & GYNECOLOGY

## 2021-12-13 NOTE — PROGRESS NOTES
Subjective   Cori Lozano is a 66 y.o. female   Chief Complaint   Patient presents with   • Gynecologic Exam     Patient here for recheck on vulva;[id number]; DEXA 2021-normal; colonoscopy -due        History of Present Illness  Cori Lozano is a 66 y.o.  who presents for an annual examination   Her itching had improved for the most part. It does come and go. Uses clobetasol on occasion. No bleeding or skin changes. Had biopsy in 2019 that suggested lichenoid changes.     Pap history:  Last pap: unsure - thinks forty years ago  Prior abnormal paps: no h/o abnormal pap smears and s/p hysterectomy for infection in   DXA:   normal  Colonoscopy:  yes, 2017 - due in   Mammogram: 20   STDs  Sexually active: yes  History of STDs: no  Menopause:  Age: late 40's/early 50's  Bleeding since? no  Vasomotor symptoms: yes, mild to moderate  HRT: no, has been on Effexor but weaning off     History of physical abuse: no, History of sexual abuse: no and History of verbal/emotional abuse: no    BRCA screening:  Is patient's family or personal history significant for any of the following? no  For non-Ashkenazi [employer] women:   Two first-degree relatives with breast cancer, one of whom was diagnosed at age 50 or younger   A combination of three or more first or second-degree relatives with breast cancer regardless of age at diagnosis   A combination of both breast and ovarian cancer among first and second-degree relatives   A first-degree relative with bilateral breast cancer   A combination of two or more first or second degree relatives with ovarian cancer, regardless of age at diagnosis   A first or second-degree relative with both breast and ovarian cancer at any age   History of breast cancer in a male relative   For women of Ashkenazi Congreg[employer] descent:   Any first-degree relative (or two second degree relatives on the same side of the family) with breast or ovarian cancer   Recommendations from  the [place] Preventive Services Task Force on who should be offered genetic testing for BRCA mutations*     Past Medical History:   Diagnosis Date   • Lynn esophagus    • Diabetes (HCC)    • GERD (gastroesophageal reflux disease)    • Hyperlipidemia    • Hypertension    • Osteoarthritis    • Seasonal allergies      Past Surgical History:   Procedure Laterality Date   • EYE SURGERY     • GALLBLADDER SURGERY     • HYSTERECTOMY     • SINUS SURGERY       Social History     Tobacco Use   • Smoking status: Never Smoker   • Smokeless tobacco: Never Used   Substance Use Topics   • Alcohol use: Yes     Comment: rarely   • Drug use: No     Family History   Problem Relation Age of Onset   • Colon cancer Mother         40's/50's   • Hypertension Mother    • Brain cancer Mother    • Diabetes Father    • Colon polyps Father    • Gout Father    • Heart disease Sister    • Hypertension Sister    • Gout Sister    • Hypertension Brother    • Breast cancer Cousin         40/50   • Ovarian cancer Neg Hx    • Uterine cancer Neg Hx    • Pulmonary embolism Neg Hx    • Deep vein thrombosis Neg Hx      Current Outpatient Medications on File Prior to Visit   Medication Sig Dispense Refill   • Ascorbic Acid (SHAREE-C PO) Take  by mouth.     • atorvastatin (LIPITOR) 10 MG tablet Take 10 mg by mouth Daily.     • calcium carb-cholecalciferol (CALCIUM 600+D) 600-800 MG-UNIT tablet      • cetirizine (zyrTEC) 10 MG tablet Take 10 mg by mouth Daily.     • clobetasol (TEMOVATE) 0.05 % ointment Apply pea size amount every other day x 30days then 2x weekly x 30 days 30 g 0   • Docusate Sodium (STOOL SOFTENER) 100 MG capsule      • Ferrous Sulfate (IRON) 325 (65 Fe) MG tablet      • FLUZONE QUADRIVALENT 0.5 ML suspension prefilled syringe injection      • glimepiride (AMARYL) 1 MG tablet      • lisinopril (PRINIVIL,ZESTRIL) 20 MG tablet Take 20 mg by mouth Daily.     • metFORMIN (GLUCOPHAGE) 1000 MG tablet Take 1,000 mg by mouth 2 (Two) Times a  "Day With Meals.     • Multiple Vitamin (MULTI VITAMIN DAILY PO) Take  by mouth.     • Multiple Vitamins-Minerals (ZINC PO) Take  by mouth.     • Omega-3 Fatty Acids (FISH OIL) 1000 MG capsule capsule Take  by mouth Daily With Breakfast.     • omeprazole (priLOSEC) 40 MG capsule      • Probiotic Product (PROBIOTIC DAILY PO) Take  by mouth.     • SITagliptin (JANUVIA) 100 MG tablet Take 100 mg by mouth Daily.       No current facility-administered medications on file prior to visit.     Allergies   Allergen Reactions   • Penicillins Hives   • Sulfa Antibiotics Rash        Review of Systems   Constitutional: Negative for activity change, appetite change, chills, fatigue, fever and unexpected weight change.   HENT: Negative for congestion, nosebleeds and sore throat.    Eyes: Negative for visual disturbance.   Respiratory: Negative for cough, chest tightness and shortness of breath.    Cardiovascular: Negative for chest pain and palpitations.   Gastrointestinal: Negative for abdominal pain, constipation, diarrhea, nausea and vomiting.   Endocrine: Negative for polyuria.   Genitourinary: Positive for dysuria, vaginal discharge and vaginal pain. Negative for difficulty urinating, dyspareunia, frequency, genital sores, hematuria, menstrual problem, pelvic pain, urgency and vaginal bleeding.   Musculoskeletal: Negative for arthralgias and joint swelling.   Skin: Negative for color change and rash.   Neurological: Negative for dizziness, light-headedness and headaches.   Hematological: Does not bruise/bleed easily.   Psychiatric/Behavioral: Negative for agitation and dysphoric mood. The patient is not nervous/anxious.        Objective    /84   Pulse 94   Ht 149.9 cm (59\")   Wt 65.5 kg (144 lb 6.4 oz)   BMI 29.17 kg/m²    Physical Exam   Constitutional: She is oriented to person, place, and time. She appears well-developed and well-nourished. No distress.   HENT:   Head: Normocephalic and atraumatic.   Neck: No " tracheal deviation present. No thyromegaly present.   Cardiovascular: Normal rate, regular rhythm and normal heart sounds.  Exam reveals no gallop and no friction rub.    No murmur heard.  Pulmonary/Chest: Effort normal and breath sounds normal. No respiratory distress. She has no wheezes. She has no rales. She exhibits no tenderness. Right breast exhibits no inverted nipple, no mass, no nipple discharge, no skin change and no tenderness. Left breast exhibits no inverted nipple, no mass, no nipple discharge, no skin change and no tenderness.   Abdominal: Soft. She exhibits no distension and no mass. There is no tenderness.   Genitourinary: No labial fusion. There is no rash, lesion or injury on the right labia. There is no rash, lesion or injury on the left labia. Right adnexum displays no mass, no tenderness and no fullness. Left adnexum displays no mass, no tenderness and no fullness. No tenderness or bleeding in the vagina. No vaginal discharge found.   Genitourinary Comments: Surgically absent uterus, cervix  Cuff intact Normal urethra and normal urethral meatus; bladder appears well supported; external genitalia with mild violaceous changes to right labia minora, no ulceration or bleeding or thickening, mild narrowing of introitus  Musculoskeletal: Normal range of motion. She exhibits no edema or deformity.   Lymphadenopathy:     She has no cervical adenopathy.   Neurological: She is alert and oriented to person, place, and time.   Skin: Skin is warm and dry. No rash noted. She is not diaphoretic.   Psychiatric: She has a normal mood and affect. Her behavior is normal. Judgment and thought content normal.         Assessment/Plan   Diagnoses and all orders for this visit:    1. Well woman exam with routine gynecological exam (Primary)      Well woman counseling/screening:    Cervical cancer screening:    Reports remote h/o cervical dysplasia   S/p hysterectomy for infection    Screening guidelines discussed with  patient  Breast cancer screening:    Mammogram UTD   Breast self awareness encouraged  Colonscopy:   Due in 2022, will contact GI  DXA   DXA ordered  Family history    does not demonstrate need for genetics referral   Healthy lifestyle counseling:   Patient was counseled on    Body mass index is 29.17 kg/m².   BP elevated - no symptoms,    Lichenoid, spongiosis changes - recommend using clobetasol every other day x 6 weeks and follow up.           BMI Counseling  Her BMI is classified as BMI 30.0-34.9        Classification: class I obesity  Today, the course of action is: lose 10-15lb by next annual

## 2021-12-14 ENCOUNTER — TELEPHONE (OUTPATIENT)
Dept: OBSTETRICS AND GYNECOLOGY | Facility: CLINIC | Age: 66
End: 2021-12-14

## 2021-12-14 NOTE — TELEPHONE ENCOUNTER
That is fine. Start with clobetasol pea size amount every other day then switch to trimacinolone pea size amount every other day x 6-8 weeks

## 2021-12-14 NOTE — TELEPHONE ENCOUNTER
Patient called she states that she was told to use her small tube of clobetasol cream for the next 6-8 weeks then use the new cream that was sent in for triamcinolone acetonide cream after. She wanted me to inform you her clobetasol cream she has left is not enough to last 6-8 weeks and wanted to know if it is ok to just start using the triamcinolone acetonide cream when she runs out?

## 2022-03-28 ENCOUNTER — OFFICE VISIT (OUTPATIENT)
Dept: OBSTETRICS AND GYNECOLOGY | Facility: CLINIC | Age: 67
End: 2022-03-28

## 2022-03-28 VITALS
SYSTOLIC BLOOD PRESSURE: 143 MMHG | WEIGHT: 144 LBS | HEIGHT: 59 IN | HEART RATE: 103 BPM | DIASTOLIC BLOOD PRESSURE: 85 MMHG | BODY MASS INDEX: 29.03 KG/M2

## 2022-03-28 DIAGNOSIS — N90.89 VULVAR IRRITATION: Primary | ICD-10-CM

## 2022-03-28 PROCEDURE — 99213 OFFICE O/P EST LOW 20 MIN: CPT | Performed by: OBSTETRICS & GYNECOLOGY

## 2022-03-28 RX ORDER — GLIMEPIRIDE 4 MG/1
TABLET ORAL
COMMUNITY
Start: 2022-02-14

## 2022-03-28 NOTE — PROGRESS NOTES
"SUBJECTIVE:   Chief Complaint   Patient presents with   • Follow-up     Pt presents today for follow up.         Cori Lozano is a 66 y.o.  who presents for follow up of vulvar skin changes; biopsy showed lichen spongiosus in 2019. She was using the cream (\"the big tube\"- triamcinolone, we think) every other day or every couple days until one week ago. Stopped to see how she would do without it. Notices some dryness, itching.      The creams are expensive,   No bleeding/ulceration    Past Medical History:   Diagnosis Date   • Lynn esophagus    • Diabetes (HCC)    • GERD (gastroesophageal reflux disease)    • Hyperlipidemia    • Hypertension    • Osteoarthritis    • Seasonal allergies      Past Surgical History:   Procedure Laterality Date   • EYE SURGERY     • GALLBLADDER SURGERY     • HYSTERECTOMY     • SINUS SURGERY       OB History    Para Term  AB Living   1    SAB IAB Ectopic Molar Multiple Live Births                    # Outcome Date GA Lbr Joe/2nd Weight Sex Delivery Anes PTL Lv   1      F Vag-Spont         Social History     Tobacco Use   • Smoking status: Never Smoker   • Smokeless tobacco: Never Used   Substance Use Topics   • Alcohol use: Yes     Comment: rarely   • Drug use: No     Family History   Problem Relation Age of Onset   • Colon cancer Mother         40's/50's   • Hypertension Mother    • Brain cancer Mother    • Diabetes Father    • Colon polyps Father    • Gout Father    • Heart disease Sister    • Hypertension Sister    • Gout Sister    • Hypertension Brother    • Breast cancer Cousin         40/50   • Ovarian cancer Neg Hx    • Uterine cancer Neg Hx    • Pulmonary embolism Neg Hx    • Deep vein thrombosis Neg Hx      Current Outpatient Medications on File Prior to Visit   Medication Sig Dispense Refill   • APPLE CIDER VINEGAR PO Take  by mouth.     • Ascorbic Acid (SHAREE-C PO) Take  by mouth.     • atorvastatin (LIPITOR) 10 MG tablet Take 10 mg by " "mouth Daily.     • calcium carb-cholecalciferol 600-800 MG-UNIT tablet      • cetirizine (zyrTEC) 10 MG tablet Take 10 mg by mouth Daily.     • Docusate Sodium 100 MG capsule      • Ferrous Sulfate (IRON) 325 (65 Fe) MG tablet      • FLUZONE QUADRIVALENT 0.5 ML suspension prefilled syringe injection      • glimepiride (AMARYL) 4 MG tablet      • lisinopril (PRINIVIL,ZESTRIL) 20 MG tablet Take 20 mg by mouth Daily.     • metFORMIN (GLUCOPHAGE) 1000 MG tablet Take 1,000 mg by mouth 2 (Two) Times a Day With Meals.     • Multiple Vitamin (MULTI VITAMIN DAILY PO) Take  by mouth.     • Multiple Vitamins-Minerals (ZINC PO) Take  by mouth.     • Omega-3 Fatty Acids (FISH OIL) 1000 MG capsule capsule Take  by mouth Daily With Breakfast.     • omeprazole (priLOSEC) 40 MG capsule      • Probiotic Product (PROBIOTIC DAILY PO) Take  by mouth.     • [DISCONTINUED] clobetasol (TEMOVATE) 0.05 % ointment Apply pea size amount every other day x 30days then 2x weekly x 30 days 30 g 0   • [DISCONTINUED] glimepiride (AMARYL) 1 MG tablet      • [DISCONTINUED] SITagliptin (JANUVIA) 100 MG tablet Take 100 mg by mouth Daily.       No current facility-administered medications on file prior to visit.     Allergies   Allergen Reactions   • Penicillins Hives   • Sulfa Antibiotics Rash        Review of Systems   See HPI      OBJECTIVE:   Vitals:    03/28/22 1249   BP: 143/85   Pulse: 103   Weight: 65.3 kg (144 lb)   Height: 149.9 cm (59.02\")      Physical Exam  Exam conducted with a chaperone present.   Constitutional:       General: She is not in acute distress.     Appearance: She is well-developed. She is not diaphoretic.   HENT:      Head: Normocephalic and atraumatic.   Eyes:      General: No scleral icterus.     Extraocular Movements: Extraocular movements intact.   Pulmonary:      Effort: Pulmonary effort is normal. No respiratory distress.   Genitourinary:     Comments: Mild erythema of left labia minora without any ulceration or " bleeding.  No hypopigmentation  Skin:     General: Skin is warm and dry.   Neurological:      General: No focal deficit present.      Mental Status: She is alert and oriented to person, place, and time.   Psychiatric:         Mood and Affect: Mood normal.         Behavior: Behavior normal.         Thought Content: Thought content normal.         Judgment: Judgment normal.         ASSESSMENT/PLAN:     ICD-10-CM ICD-9-CM   1. Vulvar irritation  N90.89 624.8       Previous biopsy showed lichen spongiosus.  Overall much improvement to the vulvar skin.  Recommend continued every other day application of topical steroid to vulva, concentrating on left side.    If she had bleeding, ulceration, worsening call immediately. May need repeat biopsy at that time  Otherwise, continue steroid. She will let us know formulary coverage as both triamcinolone and clobetasol have been costly.     No orders of the defined types were placed in this encounter.      No follow-ups on file.

## 2022-03-30 ENCOUNTER — TELEPHONE (OUTPATIENT)
Dept: OBSTETRICS AND GYNECOLOGY | Facility: CLINIC | Age: 67
End: 2022-03-30

## 2022-03-30 RX ORDER — TRIAMCINOLONE ACETONIDE 5 MG/G
1 CREAM TOPICAL 2 TIMES DAILY
Qty: 60 G | Refills: 2 | Status: SHIPPED | OUTPATIENT
Start: 2022-03-30 | End: 2022-03-30

## 2022-03-30 RX ORDER — TRIAMCINOLONE ACETONIDE 5 MG/G
1 CREAM TOPICAL 2 TIMES DAILY
Qty: 60 G | Refills: 2 | Status: SHIPPED | OUTPATIENT
Start: 2022-03-30 | End: 2022-06-28

## 2022-03-30 NOTE — TELEPHONE ENCOUNTER
Rx sent. I do not know what they see as a 90 day supply, so I'm happy to change the amount if needed. Thanks!

## 2022-03-30 NOTE — TELEPHONE ENCOUNTER
Chantal- Can you please see what question she has?   We had prescribed both triamcinolone and clobetasol.  Both were expensive for her. She thought she ran out of the clobetasol and had triamcinolone left. See what she has at home and let me know her questions. Thanks!

## 2022-03-30 NOTE — TELEPHONE ENCOUNTER
I spoke with Jerilyn. She has called her insurance company. Jerilyn would like the Triamcinolone . It is covered by her insurance and 30 days costs $4 and 90 days costs $12. Jerilyn would like the 90 day supply and have it come mail order from [facility], [phone].    I have added [facility] to her pharmacy list.

## 2022-03-31 NOTE — TELEPHONE ENCOUNTER
Spoke with Jerilyn to let her know that Dr Vila has sent in her Triamcinolone cream 60 gram with 2 refills to the [facility] mailservice. I told Jerilyn that Dr Vila did not see a 90 day supply but she is happy to change it if Jerilyn needs her to.

## 2022-04-05 ENCOUNTER — TELEPHONE (OUTPATIENT)
Dept: OBSTETRICS AND GYNECOLOGY | Facility: CLINIC | Age: 67
End: 2022-04-05

## 2022-04-05 NOTE — TELEPHONE ENCOUNTER
Good morning,   Patient is calling in regards to medication she got prescribed.   Rx- triamcinolone (KENALOG) 0.5 % cream     Patient stated the directions on the box of the Rx states that patient should use twice a day everyday.  Patient stated she thought provider wanted her to use cream every other day.   Patient is just wanting to make sure of what dose she should be using.       Please advise,   Thank you.

## 2022-04-05 NOTE — TELEPHONE ENCOUNTER
Thank you for clarification: She can use nightly until our next visit. If symptoms improve in the next 2 months after 8 weeks, can taper to every other night. Thanks!

## 2022-04-06 NOTE — TELEPHONE ENCOUNTER
Spoke with Cori to let her know that Dr Vila said thank you for the clarification. Dr Vila said that you should use the cream nightly until your next visit.  If the symptoms improve in the next 2 months after 8 weeks, you can taper to every other night. Your appointment is 06/28/2022. Thank you again.

## 2022-06-28 ENCOUNTER — OFFICE VISIT (OUTPATIENT)
Dept: OBSTETRICS AND GYNECOLOGY | Facility: CLINIC | Age: 67
End: 2022-06-28

## 2022-06-28 VITALS
SYSTOLIC BLOOD PRESSURE: 159 MMHG | BODY MASS INDEX: 28.75 KG/M2 | HEART RATE: 95 BPM | WEIGHT: 142.6 LBS | HEIGHT: 59 IN | DIASTOLIC BLOOD PRESSURE: 79 MMHG

## 2022-06-28 DIAGNOSIS — L28.0 LICHEN: Primary | ICD-10-CM

## 2022-06-28 PROCEDURE — 99212 OFFICE O/P EST SF 10 MIN: CPT | Performed by: OBSTETRICS & GYNECOLOGY

## 2022-06-28 RX ORDER — HYDROCODONE BITARTRATE AND ACETAMINOPHEN 5; 325 MG/1; MG/1
1 TABLET ORAL EVERY 6 HOURS PRN
COMMUNITY
Start: 2022-06-16 | End: 2023-02-21

## 2022-06-28 NOTE — PROGRESS NOTES
SUBJECTIVE:   Chief Complaint   Patient presents with   • Follow-up     Pt presents today for 3 month f/u         Cori Lozano is a 66 y.o.  who presents for follow up of lichen. She reports feeling much better. Was applying steroid cream daily until about 2 weeks ago when she had carpal tunnel surgery. She has been using it every other day and doing well.    Past Medical History:   Diagnosis Date   • Lynn esophagus    • Diabetes (HCC)    • GERD (gastroesophageal reflux disease)    • Hyperlipidemia    • Hypertension    • Osteoarthritis    • Seasonal allergies      Past Surgical History:   Procedure Laterality Date   • EYE SURGERY     • GALLBLADDER SURGERY     • HYSTERECTOMY     • SINUS SURGERY       OB History    Para Term  AB Living   1    1   SAB IAB Ectopic Molar Multiple Live Births                    # Outcome Date GA Lbr Joe/2nd Weight Sex Delivery Anes PTL Lv   1      F Vag-Spont         Social History     Tobacco Use   • Smoking status: Never Smoker   • Smokeless tobacco: Never Used   Substance Use Topics   • Alcohol use: Yes     Comment: rarely   • Drug use: No     Family History   Problem Relation Age of Onset   • Colon cancer Mother         40's/50's   • Hypertension Mother    • Brain cancer Mother    • Diabetes Father    • Colon polyps Father    • Gout Father    • Heart disease Sister    • Hypertension Sister    • Gout Sister    • Hypertension Brother    • Breast cancer Cousin         40/50   • Ovarian cancer Neg Hx    • Uterine cancer Neg Hx    • Pulmonary embolism Neg Hx    • Deep vein thrombosis Neg Hx      Current Outpatient Medications on File Prior to Visit   Medication Sig Dispense Refill   • APPLE CIDER VINEGAR PO Take  by mouth.     • Ascorbic Acid (SHAREE-C PO) Take  by mouth.     • atorvastatin (LIPITOR) 10 MG tablet Take 10 mg by mouth Daily.     • calcium carb-cholecalciferol 600-800 MG-UNIT tablet      • cetirizine (zyrTEC) 10 MG tablet Take 10 mg by mouth  "Daily.     • Docusate Sodium 100 MG capsule      • Ferrous Sulfate (IRON) 325 (65 Fe) MG tablet      • FLUZONE QUADRIVALENT 0.5 ML suspension prefilled syringe injection      • glimepiride (AMARYL) 4 MG tablet      • lisinopril (PRINIVIL,ZESTRIL) 20 MG tablet Take 20 mg by mouth Daily.     • metFORMIN (GLUCOPHAGE) 1000 MG tablet Take 1,000 mg by mouth 2 (Two) Times a Day With Meals.     • Multiple Vitamin (MULTI VITAMIN DAILY PO) Take  by mouth.     • Multiple Vitamins-Minerals (ZINC PO) Take  by mouth.     • Omega-3 Fatty Acids (FISH OIL) 1000 MG capsule capsule Take  by mouth Daily With Breakfast.     • omeprazole (priLOSEC) 40 MG capsule      • Probiotic Product (PROBIOTIC DAILY PO) Take  by mouth.     • triamcinolone (KENALOG) 0.5 % cream Apply 1 application topically to the appropriate area as directed 2 (Two) Times a Day for 90 days. 60 g 2   • HYDROcodone-acetaminophen (NORCO) 5-325 MG per tablet Take 1 tablet by mouth Every 6 (Six) Hours As Needed. for pain       No current facility-administered medications on file prior to visit.     Allergies   Allergen Reactions   • Penicillins Hives   • Adhesive Tape Rash   • Sulfa Antibiotics Rash        Review of Systems      OBJECTIVE:   Vitals:    06/28/22 1136   BP: 159/79   Pulse: 95   Weight: 64.7 kg (142 lb 9.6 oz)   Height: 149.9 cm (59.02\")      Physical Exam  Exam conducted with a chaperone present.   Constitutional:       Appearance: She is well-developed.   HENT:      Head: Normocephalic and atraumatic.   Eyes:      General: No scleral icterus.  Cardiovascular:      Rate and Rhythm: Normal rate.   Pulmonary:      Effort: Pulmonary effort is normal. No respiratory distress.   Abdominal:      General: There is no distension.      Palpations: Abdomen is soft.      Tenderness: There is no abdominal tenderness. There is no guarding.   Genitourinary:     General: Normal vulva.      Labia:         Right: No rash, tenderness or lesion.         Left: No rash, " tenderness or lesion.       Comments: Mild erythema on left labia minora without ulceration or tenderness  Musculoskeletal:      Cervical back: Normal range of motion.   Skin:     General: Skin is warm and dry.   Neurological:      Mental Status: She is alert and oriented to person, place, and time.   Psychiatric:         Behavior: Behavior normal.         ASSESSMENT/PLAN:     ICD-10-CM ICD-9-CM   1. Lichen  L28.0 697.9       Continue Triamcinolone and titrate as needed   RTO with ulceration, bleeding or other concerns  Due for annual exam in six months    No orders of the defined types were placed in this encounter.      Return in about 6 months (around 12/28/2022).

## 2023-02-21 ENCOUNTER — OFFICE VISIT (OUTPATIENT)
Dept: OBSTETRICS AND GYNECOLOGY | Facility: CLINIC | Age: 68
End: 2023-02-21
Payer: MEDICARE

## 2023-02-21 ENCOUNTER — PROCEDURE VISIT (OUTPATIENT)
Dept: OBSTETRICS AND GYNECOLOGY | Facility: CLINIC | Age: 68
End: 2023-02-21
Payer: MEDICARE

## 2023-02-21 VITALS
WEIGHT: 144.2 LBS | HEIGHT: 59 IN | HEART RATE: 92 BPM | SYSTOLIC BLOOD PRESSURE: 154 MMHG | DIASTOLIC BLOOD PRESSURE: 84 MMHG | BODY MASS INDEX: 29.07 KG/M2

## 2023-02-21 DIAGNOSIS — N39.41 URGE INCONTINENCE: ICD-10-CM

## 2023-02-21 DIAGNOSIS — Z01.419 WOMEN'S ANNUAL ROUTINE GYNECOLOGICAL EXAMINATION: Primary | ICD-10-CM

## 2023-02-21 DIAGNOSIS — G47.9 SLEEP DISTURBANCE: ICD-10-CM

## 2023-02-21 DIAGNOSIS — Z12.11 SCREENING FOR COLON CANCER: ICD-10-CM

## 2023-02-21 DIAGNOSIS — L28.0 LICHEN: ICD-10-CM

## 2023-02-21 DIAGNOSIS — Z12.31 VISIT FOR SCREENING MAMMOGRAM: Primary | ICD-10-CM

## 2023-02-21 DIAGNOSIS — Z78.0 POSTMENOPAUSE: ICD-10-CM

## 2023-02-21 PROCEDURE — 77063 BREAST TOMOSYNTHESIS BI: CPT | Performed by: OBSTETRICS & GYNECOLOGY

## 2023-02-21 PROCEDURE — G0101 CA SCREEN;PELVIC/BREAST EXAM: HCPCS | Performed by: NURSE PRACTITIONER

## 2023-02-21 PROCEDURE — 77067 SCR MAMMO BI INCL CAD: CPT | Performed by: OBSTETRICS & GYNECOLOGY

## 2023-02-21 PROCEDURE — 99213 OFFICE O/P EST LOW 20 MIN: CPT | Performed by: NURSE PRACTITIONER

## 2023-02-21 RX ORDER — ZINC GLUCONATE 50 MG
TABLET ORAL
COMMUNITY

## 2023-02-21 RX ORDER — ASPIRIN 81 MG/1
81 TABLET ORAL DAILY
COMMUNITY

## 2023-02-21 RX ORDER — TRIAMCINOLONE ACETONIDE 5 MG/G
CREAM TOPICAL 3 TIMES WEEKLY
Qty: 15 G | Refills: 4 | Status: SHIPPED | OUTPATIENT
Start: 2023-02-21

## 2023-02-21 RX ORDER — LIFITEGRAST 50 MG/ML
1 SOLUTION/ DROPS OPHTHALMIC 2 TIMES DAILY
COMMUNITY

## 2023-02-21 RX ORDER — TRIAMCINOLONE ACETONIDE 5 MG/G
CREAM TOPICAL
COMMUNITY
Start: 2023-02-18 | End: 2023-02-21 | Stop reason: SDUPTHER

## 2023-02-21 NOTE — PROGRESS NOTES
GYN Annual Exam     Chief Complaint   Patient presents with   • Annual Exam     Last AE 2021. Mammo today.      Cori Lozano is a 67 y.o. female who presents for annual well woman exam. She is postmenopausal. Prior hysterectomy in  for infection. She denies vaginal spotting or discharge. She completes SBE monthly. Hx lichen spongiosis, reports red patch that is often present, intermittently itches. Treats with triamcinolone cream with improvement in symptoms. BP is elevated today, hx HTN, reports normally well controlled, she think is elevated because she has been rushing this AM. C/o sleep disturbance, waking several times a night, approx every 2 hours. She is waking up sweating and warm.     This is my first time meeting Cori Lozano  She is a patient of Dr. Vila's.     OB History        1    Para   1    Term           1    AB        Living   1       SAB        IAB        Ectopic        Molar        Multiple        Live Births                    Mammogram: today  Dexa scan:  Normal  Colonoscopy: due   Last Pap : several years ago  History of abnormal Pap smear: no  Family history of uterine, colon or ovarian cancer: yes - mother, colon cancer  Family history of breast cancer: yes - cousin  History of abnormal mammogram: yes - f/u WNL    Menopause:  Bleeding since? no  Vasomotor symptoms: yes  HRT: no  Incontinence? Urge incontinence  Dyspareunia: no      Past Medical History:   Diagnosis Date   • Lynn esophagus    • Diabetes (HCC)    • GERD (gastroesophageal reflux disease)    • Hyperlipidemia    • Hypertension    • Osteoarthritis    • Seasonal allergies        Past Surgical History:   Procedure Laterality Date   • EYE SURGERY     • GALLBLADDER SURGERY     • HYSTERECTOMY     • SINUS SURGERY           Current Outpatient Medications:   •  APPLE CIDER VINEGAR PO, Take  by mouth., Disp: , Rfl:   •  Ascorbic Acid (SHAREE-C PO), Take  by mouth., Disp: , Rfl:   •  aspirin 81 MG EC  tablet, Take 81 mg by mouth Daily., Disp: , Rfl:   •  atorvastatin (LIPITOR) 10 MG tablet, Take 10 mg by mouth Daily., Disp: , Rfl:   •  calcium carb-cholecalciferol 600-800 MG-UNIT tablet, , Disp: , Rfl:   •  cetirizine (zyrTEC) 10 MG tablet, Take 10 mg by mouth Daily., Disp: , Rfl:   •  Docusate Sodium 100 MG capsule, , Disp: , Rfl:   •  Ferrous Sulfate (IRON) 325 (65 Fe) MG tablet, , Disp: , Rfl:   •  glimepiride (AMARYL) 4 MG tablet, , Disp: , Rfl:   •  Lifitegrast (Xiidra) 5 % ophthalmic solution, Administer 1 drop to both eyes 2 (Two) Times a Day., Disp: , Rfl:   •  lisinopril (PRINIVIL,ZESTRIL) 20 MG tablet, Take 20 mg by mouth Daily., Disp: , Rfl:   •  metFORMIN (GLUCOPHAGE) 1000 MG tablet, Take 1,000 mg by mouth 2 (Two) Times a Day With Meals., Disp: , Rfl:   •  Multiple Vitamin (MULTI VITAMIN DAILY PO), Take  by mouth., Disp: , Rfl:   •  Omega-3 Fatty Acids (FISH OIL) 1000 MG capsule capsule, Take  by mouth Daily With Breakfast., Disp: , Rfl:   •  omeprazole (priLOSEC) 40 MG capsule, , Disp: , Rfl:   •  Probiotic Product (PROBIOTIC DAILY PO), Take  by mouth., Disp: , Rfl:   •  triamcinolone (KENALOG) 0.5 % cream, Apply  topically to the appropriate area as directed 3 (Three) Times a Week., Disp: 15 g, Rfl: 4  •  Zinc 50 MG tablet, Take  by mouth., Disp: , Rfl:     Allergies   Allergen Reactions   • Penicillins Hives   • Adhesive Tape Rash   • New Skin Rash   • Sulfa Antibiotics Rash       Social History     Tobacco Use   • Smoking status: Never   • Smokeless tobacco: Never   Substance Use Topics   • Alcohol use: Yes     Comment: rarely   • Drug use: No       Family History   Problem Relation Age of Onset   • Colon cancer Mother         40's/50's   • Hypertension Mother    • Brain cancer Mother    • Diabetes Father    • Colon polyps Father    • Gout Father    • Heart disease Sister    • Hypertension Sister    • Gout Sister    • Hypertension Brother    • Breast cancer Cousin         40/50   • Ovarian cancer  "Neg Hx    • Uterine cancer Neg Hx    • Pulmonary embolism Neg Hx    • Deep vein thrombosis Neg Hx        Review of Systems   Constitutional: Negative for chills, fatigue and fever.   Gastrointestinal: Negative for abdominal distention, abdominal pain, nausea and vomiting.   Endocrine: Positive for heat intolerance. Negative for cold intolerance.   Genitourinary: Negative for dyspareunia, dysuria, menstrual problem, pelvic pain, vaginal bleeding, vaginal discharge and vaginal pain.        + intermittent vulvar itching   Musculoskeletal: Negative for gait problem.   Skin: Negative for rash.   Neurological: Negative for dizziness and headaches.   Hematological: Does not bruise/bleed easily.   Psychiatric/Behavioral: Positive for sleep disturbance. Negative for behavioral problems.       /84   Pulse 92   Ht 149.9 cm (59\")   Wt 65.4 kg (144 lb 3.2 oz)   BMI 29.12 kg/m²     Physical Exam  Constitutional:       General: She is not in acute distress.     Appearance: Normal appearance. She is obese. She is not ill-appearing, toxic-appearing or diaphoretic.   Genitourinary:      Vulva, bladder and urethral meatus normal.      No lesions in the vagina.      Right Labia: No rash, tenderness, lesions, skin changes or Bartholin's cyst.     Left Labia: No tenderness, lesions, skin changes, Bartholin's cyst or rash.     No labial fusion noted.            No inguinal adenopathy present in the right or left side.     Vaginal cuff intact.     No vaginal discharge, erythema, tenderness, bleeding or ulceration.      No vaginal prolapse present.     No vaginal atrophy present.       Right Adnexa: not tender, not full, not palpable, no mass present and not absent.     Left Adnexa: not tender, not full, not palpable, no mass present and not absent.     Cervix is absent.      Uterus is absent.      No urethral tenderness or mass present.      Pelvic exam was performed with patient in the lithotomy position.   Breasts:     Breasts " are symmetrical.      Right: Present. No swelling, bleeding, inverted nipple, mass, nipple discharge, skin change, tenderness or breast implant.      Left: Present. No swelling, bleeding, inverted nipple, mass, nipple discharge, skin change, tenderness or breast implant.   HENT:      Head: Normocephalic and atraumatic.   Eyes:      Pupils: Pupils are equal, round, and reactive to light.   Cardiovascular:      Rate and Rhythm: Normal rate.   Pulmonary:      Effort: Pulmonary effort is normal.   Abdominal:      General: There is no distension.      Palpations: Abdomen is soft. There is no mass.      Tenderness: There is no abdominal tenderness. There is no guarding.      Hernia: No hernia is present. There is no hernia in the left inguinal area or right inguinal area.   Musculoskeletal:         General: Normal range of motion.      Cervical back: Normal range of motion and neck supple. No tenderness.   Lymphadenopathy:      Cervical: No cervical adenopathy.      Upper Body:      Right upper body: No supraclavicular, axillary or pectoral adenopathy.      Left upper body: No supraclavicular, axillary or pectoral adenopathy.      Lower Body: No right inguinal adenopathy. No left inguinal adenopathy.   Neurological:      General: No focal deficit present.      Mental Status: She is alert and oriented to person, place, and time.      Cranial Nerves: No cranial nerve deficit.   Skin:     General: Skin is warm and dry.   Psychiatric:         Mood and Affect: Mood normal.         Behavior: Behavior normal.         Thought Content: Thought content normal.         Judgment: Judgment normal.   Vitals and nursing note reviewed.         Assessment    Diagnoses and all orders for this visit:    1. Women's annual routine gynecological examination (Primary)    2. Postmenopause    3. Lichen  -     triamcinolone (KENALOG) 0.5 % cream; Apply  topically to the appropriate area as directed 3 (Three) Times a Week.  Dispense: 15 g; Refill:  4    4. Screening for colon cancer  -     Ambulatory Referral For Screening Colonoscopy    5. Urge incontinence    6. Sleep disturbance         Plan     1) Breast Health - Clinical breast exam & mammogram yearly, Self breast awareness. Schedule screening mammogram.   2) Pap - not indicated secondary to prior hysterectomy, no hx dysplasia  3) STD-no  4) Smoking status- nonsmoker  5) Colon health - screening colonoscopy recommended if not up to date  6) Overweight by BMI 29.12  7) Bone health - Weight bearing exercise, dietary calcium recommendations and vitamin D  reviewed.   8) Encouraged 150 minutes of exercise per week if not medically contraindicated  9) Urge incontinence is mild at this time  10) Lichen spongiosis stable at this time. Triamcinolone refilled  11) Discussed sleep hygiene. Recommend sleep studying. Follow up with PCP to discuss medications to treat. For night sweats she should avoid caffeine, tobacco, alcohol, spicy foods, and dehydration    Follow up prn and one year    Angela Atwood, APRN  2/21/2023  12:48 EST

## 2023-03-06 ENCOUNTER — TELEPHONE (OUTPATIENT)
Dept: GASTROENTEROLOGY | Facility: CLINIC | Age: 68
End: 2023-03-06
Payer: MEDICARE

## 2023-03-17 ENCOUNTER — PREP FOR SURGERY (OUTPATIENT)
Dept: SURGERY | Facility: SURGERY CENTER | Age: 68
End: 2023-03-17
Payer: MEDICARE

## 2023-03-17 DIAGNOSIS — Z12.11 ENCOUNTER FOR SCREENING FOR MALIGNANT NEOPLASM OF COLON: Primary | ICD-10-CM

## 2023-03-17 DIAGNOSIS — K21.9 GASTROESOPHAGEAL REFLUX DISEASE, UNSPECIFIED WHETHER ESOPHAGITIS PRESENT: ICD-10-CM

## 2023-03-17 RX ORDER — SODIUM CHLORIDE, SODIUM LACTATE, POTASSIUM CHLORIDE, CALCIUM CHLORIDE 600; 310; 30; 20 MG/100ML; MG/100ML; MG/100ML; MG/100ML
30 INJECTION, SOLUTION INTRAVENOUS CONTINUOUS PRN
OUTPATIENT
Start: 2023-03-17

## 2023-06-06 ENCOUNTER — HOSPITAL ENCOUNTER (OUTPATIENT)
Facility: SURGERY CENTER | Age: 68
Setting detail: HOSPITAL OUTPATIENT SURGERY
Discharge: HOME | End: 2023-06-06
Attending: INTERNAL MEDICINE | Admitting: INTERNAL MEDICINE
Payer: MEDICARE

## 2023-06-06 ENCOUNTER — ANESTHESIA (OUTPATIENT)
Dept: SURGERY | Facility: SURGERY CENTER | Age: 68
End: 2023-06-06
Payer: MEDICARE

## 2023-06-06 ENCOUNTER — ANESTHESIA EVENT (OUTPATIENT)
Dept: SURGERY | Facility: SURGERY CENTER | Age: 68
End: 2023-06-06
Payer: MEDICARE

## 2023-06-06 VITALS
TEMPERATURE: 98.2 F | OXYGEN SATURATION: 97 % | HEART RATE: 92 BPM | WEIGHT: 144 LBS | BODY MASS INDEX: 30.23 KG/M2 | HEIGHT: 58 IN | DIASTOLIC BLOOD PRESSURE: 84 MMHG | RESPIRATION RATE: 18 BRPM | SYSTOLIC BLOOD PRESSURE: 124 MMHG

## 2023-06-06 DIAGNOSIS — Z12.11 ENCOUNTER FOR SCREENING FOR MALIGNANT NEOPLASM OF COLON: ICD-10-CM

## 2023-06-06 DIAGNOSIS — K21.9 GASTROESOPHAGEAL REFLUX DISEASE, UNSPECIFIED WHETHER ESOPHAGITIS PRESENT: ICD-10-CM

## 2023-06-06 PROCEDURE — 45380 COLONOSCOPY AND BIOPSY: CPT | Performed by: INTERNAL MEDICINE

## 2023-06-06 PROCEDURE — 88313 SPECIAL STAINS GROUP 2: CPT | Performed by: INTERNAL MEDICINE

## 2023-06-06 PROCEDURE — 25010000002 PROPOFOL 10 MG/ML EMULSION: Performed by: ANESTHESIOLOGY

## 2023-06-06 PROCEDURE — 88305 TISSUE EXAM BY PATHOLOGIST: CPT | Performed by: INTERNAL MEDICINE

## 2023-06-06 PROCEDURE — 43239 EGD BIOPSY SINGLE/MULTIPLE: CPT | Performed by: INTERNAL MEDICINE

## 2023-06-06 RX ORDER — KETOTIFEN FUMARATE 0.35 MG/ML
1 SOLUTION/ DROPS OPHTHALMIC 2 TIMES DAILY
COMMUNITY

## 2023-06-06 RX ORDER — SODIUM CHLORIDE 0.9 % (FLUSH) 0.9 %
10 SYRINGE (ML) INJECTION AS NEEDED
Status: DISCONTINUED | OUTPATIENT
Start: 2023-06-06 | End: 2023-06-06 | Stop reason: HOSPADM

## 2023-06-06 RX ORDER — LIDOCAINE HYDROCHLORIDE 20 MG/ML
INJECTION, SOLUTION INFILTRATION; PERINEURAL AS NEEDED
Status: DISCONTINUED | OUTPATIENT
Start: 2023-06-06 | End: 2023-06-06 | Stop reason: SURG

## 2023-06-06 RX ORDER — SODIUM CHLORIDE, SODIUM LACTATE, POTASSIUM CHLORIDE, CALCIUM CHLORIDE 600; 310; 30; 20 MG/100ML; MG/100ML; MG/100ML; MG/100ML
30 INJECTION, SOLUTION INTRAVENOUS CONTINUOUS PRN
Status: DISCONTINUED | OUTPATIENT
Start: 2023-06-06 | End: 2023-06-06 | Stop reason: HOSPADM

## 2023-06-06 RX ORDER — SODIUM CHLORIDE, SODIUM LACTATE, POTASSIUM CHLORIDE, CALCIUM CHLORIDE 600; 310; 30; 20 MG/100ML; MG/100ML; MG/100ML; MG/100ML
1000 INJECTION, SOLUTION INTRAVENOUS CONTINUOUS
Status: DISCONTINUED | OUTPATIENT
Start: 2023-06-06 | End: 2023-06-06 | Stop reason: HOSPADM

## 2023-06-06 RX ORDER — MAGNESIUM HYDROXIDE 1200 MG/15ML
LIQUID ORAL AS NEEDED
Status: DISCONTINUED | OUTPATIENT
Start: 2023-06-06 | End: 2023-06-06 | Stop reason: HOSPADM

## 2023-06-06 RX ORDER — LIDOCAINE HYDROCHLORIDE 10 MG/ML
0.5 INJECTION, SOLUTION INFILTRATION; PERINEURAL ONCE AS NEEDED
Status: DISCONTINUED | OUTPATIENT
Start: 2023-06-06 | End: 2023-06-06 | Stop reason: HOSPADM

## 2023-06-06 RX ORDER — PROPOFOL 10 MG/ML
VIAL (ML) INTRAVENOUS AS NEEDED
Status: DISCONTINUED | OUTPATIENT
Start: 2023-06-06 | End: 2023-06-06 | Stop reason: SURG

## 2023-06-06 RX ADMIN — SODIUM CHLORIDE, POTASSIUM CHLORIDE, SODIUM LACTATE AND CALCIUM CHLORIDE 30 ML/HR: 600; 310; 30; 20 INJECTION, SOLUTION INTRAVENOUS at 07:22

## 2023-06-06 RX ADMIN — LIDOCAINE HYDROCHLORIDE 30 MG: 20 INJECTION, SOLUTION INFILTRATION; PERINEURAL at 08:11

## 2023-06-06 RX ADMIN — PROPOFOL 150 MG: 10 INJECTION, EMULSION INTRAVENOUS at 08:11

## 2023-06-06 RX ADMIN — PROPOFOL 200 MCG/KG/MIN: 10 INJECTION, EMULSION INTRAVENOUS at 08:11

## 2023-06-06 RX ADMIN — GLYCOPYRROLATE 0.2 MG: 0.2 INJECTION, SOLUTION INTRAMUSCULAR; INTRAVITREAL at 08:11

## 2023-06-06 NOTE — ANESTHESIA PREPROCEDURE EVALUATION
Anesthesia Evaluation     Patient summary reviewed and Nursing notes reviewed   NPO Solid Status: > 6 hours  NPO Liquid Status: > 2 hours           Airway   Mallampati: II  TM distance: >3 FB  Neck ROM: full  Dental - normal exam     Pulmonary    (-) COPD, asthma, sleep apnea, not a smoker  Cardiovascular     (+) hypertensionhyperlipidemia  (-) CAD, dysrhythmias, angina      Neuro/Psych  (-) seizures, CVA  GI/Hepatic/Renal/Endo    (+) GERD well controlled, diabetes mellitus type 2  (-) liver disease, no renal disease, no thyroid disorder    Musculoskeletal     Abdominal    Substance History      OB/GYN          Other                        Anesthesia Plan    ASA 2     MAC       Anesthetic plan, risks, benefits, and alternatives have been provided, discussed and informed consent has been obtained with: patient.      CODE STATUS:

## 2023-06-06 NOTE — H&P
No chief complaint on file.      HPI  Gerd  H/o polyps         Problem List:    Patient Active Problem List   Diagnosis    Encounter for gynecological examination with abnormal finding    Cerebrovascular small vessel disease    Esophageal reflux    Anemia    History of adenomatous polyp of colon    Hyperlipidemia    Hypertension    Menopausal symptom    Disorder of wrist joint    Type 2 diabetes mellitus       Medical History:    Past Medical History:   Diagnosis Date    Lynn esophagus     Diabetes     GERD (gastroesophageal reflux disease)     Hyperlipidemia     Hypertension     Osteoarthritis     Seasonal allergies         Social History:    Social History     Socioeconomic History    Marital status:    Tobacco Use    Smoking status: Never    Smokeless tobacco: Never   Substance and Sexual Activity    Alcohol use: Yes     Comment: rarely    Drug use: No    Sexual activity: Yes     Partners: Male     Birth control/protection: None       Family History:   Family History   Problem Relation Age of Onset    Colon cancer Mother         40's/50's    Hypertension Mother     Brain cancer Mother     Diabetes Father     Colon polyps Father     Gout Father     Heart disease Sister     Hypertension Sister     Gout Sister     Hypertension Brother     Breast cancer Cousin         40/50    Ovarian cancer Neg Hx     Uterine cancer Neg Hx     Pulmonary embolism Neg Hx     Deep vein thrombosis Neg Hx        Surgical History:   Past Surgical History:   Procedure Laterality Date    EYE SURGERY      GALLBLADDER SURGERY      HYSTERECTOMY      SINUS SURGERY           Current Facility-Administered Medications:     lactated ringers infusion, 30 mL/hr, Intravenous, Continuous PRN, Facundo Sanches MD    Allergies:   Allergies   Allergen Reactions    Penicillins Hives    Adhesive Tape Rash    New Skin Rash    Sulfa Antibiotics Rash        The following portions of the patient's history were reviewed by me and updated as  appropriate: review of systems, allergies, current medications, past family history, past medical history, past social history, past surgical history and problem list.    Vitals:    06/06/23 0701   BP: 174/84   Pulse: 88   Resp: 18   Temp: 98.4 °F (36.9 °C)   SpO2: 99%       PHYSICAL EXAM:    CONSTITUTIONAL:  today's vital signs reviewed by me  GASTROINTESTINAL: abdomen is soft nontender nondistended with normal active bowel sounds, no masses are appreciated    Assessment/ Plan  Gerd  H/o ployps    Egd colonoscopy    Risks and benefits as well as alternatives to endoscopic evaluation were explained to the patient and they voiced understanding and wish to proceed.  These risks include but are not limited to the risk of bleeding, perforation, adverse reaction to sedation, and missed lesions.  The patient was given the opportunity to ask questions prior to the endoscopic procedure.

## 2023-06-06 NOTE — DISCHARGE INSTRUCTIONS
ENDOSCOPY - EGD/COLONOSCOPY       ADULT CARE DISCHARGE  INSTRUCTIONS     Symptoms you may temporarily experience:      Sore Throat     Hoarseness     Bloating/Cramping     Dizziness     IV Irritation/tenderness     Gas or Belching     Slight fever     Small amount of blood in vomit or stool       Call Your Doctor for the following Problems: ______________________     ________________     Fever of 101 degrees or higher       Sharp abdominal  pain     Red streak up the arm from the IV site     Severe cramping        Large amount of blood in stool or vomit       Instructions for the next 24 hours after your Procedure:     Adult supervision     Do NOT drink any alcohol      Do not work today     NO important decisions     DO NOT sign any legal documents     You may shower/ bathe       DO NOT  DRIVE or operate machinery     Resume normal activity tomorrow       Discharge  Diet:     Avoid spicy/ greasy foods     Avoid any food that will cause more gas or bloating       *** Seek IMMEDIATE medical attention and call 911 if you develop symptoms such as:     Chest pain     Shortness of breath     Severe bleeding ENDOSCOPY - EGD/COLONOSCOPY       ADULT CARE DISCHARGE  INSTRUCTIONS     Symptoms you may temporarily experience:      Sore Throat     Hoarseness     Bloating/Cramping     Dizziness     IV Irritation/tenderness     Gas or Belching     Slight fever     Small amount of blood in vomit or stool       Call Your Doctor for the following Problems: ______________________     ________________     Fever of 101 degrees or higher       Sharp abdominal  pain     Red streak up the arm from the IV site     Severe cramping        Large amount of blood in stool or vomit       Instructions for the next 24 hours after your Procedure:     Adult supervision     Do NOT drink any alcohol      Do not work today     NO important decisions     DO NOT sign any legal documents     You may shower/ bathe       DO NOT  DRIVE or operate machinery      Resume normal activity tomorrow       Discharge  Diet:     Avoid spicy/ greasy foods     Avoid any food that will cause more gas or bloating       *** Seek IMMEDIATE medical attention and call 911 if you develop symptoms such as:     Chest pain     Shortness of breath     Severe bleeding

## 2023-06-06 NOTE — ANESTHESIA POSTPROCEDURE EVALUATION
Patient: Cori Lozano    Procedure Summary       Date: 06/06/23 Room / Location: [facility]  / [facility]    Anesthesia Start: 0809 Anesthesia Stop: 0839    Procedures:       COLONOSCOPY FOR SCREENING      ESOPHAGOGASTRODUODENOSCOPY Diagnosis:       Encounter for screening for malignant neoplasm of colon      Gastroesophageal reflux disease, unspecified whether esophagitis present      (Encounter for screening for malignant neoplasm of colon [Z12.11])      (Gastroesophageal reflux disease, unspecified whether esophagitis present [K21.9])    Surgeons: Facundo Sanches MD Provider: Naveen Iniguez MD    Anesthesia Type: MAC ASA Status: 2            Anesthesia Type: MAC    Vitals  Vitals Value Taken Time   /84 06/06/23 0849   Temp 36.8 °C (98.2 °F) 06/06/23 0839   Pulse 92 06/06/23 0849   Resp 18 06/06/23 0849   SpO2 97 % 06/06/23 0849           Post Anesthesia Care and Evaluation    Patient location during evaluation: PHASE II  Patient participation: complete - patient participated  Level of consciousness: awake  Pain management: satisfactory to patient    Airway patency: patent  Anesthetic complications: No anesthetic complications  PONV Status: controlled  Cardiovascular status: acceptable  Respiratory status: acceptable  Hydration status: acceptable

## 2023-06-08 LAB
LAB AP CASE REPORT: NORMAL
LAB AP SPECIAL STAINS: NORMAL
PATH REPORT.FINAL DX SPEC: NORMAL
PATH REPORT.GROSS SPEC: NORMAL

## 2023-09-23 DIAGNOSIS — L28.0 LICHEN: ICD-10-CM

## 2023-09-26 RX ORDER — TRIAMCINOLONE ACETONIDE 5 MG/G
CREAM TOPICAL
Qty: 60 G | Refills: 2 | Status: SHIPPED | OUTPATIENT
Start: 2023-09-26

## 2024-02-22 ENCOUNTER — PROCEDURE VISIT (OUTPATIENT)
Dept: OBSTETRICS AND GYNECOLOGY | Facility: CLINIC | Age: 69
End: 2024-02-22
Payer: MEDICARE

## 2024-02-22 DIAGNOSIS — Z12.31 VISIT FOR SCREENING MAMMOGRAM: Primary | ICD-10-CM

## 2025-03-26 ENCOUNTER — PROCEDURE VISIT (OUTPATIENT)
Dept: OBSTETRICS AND GYNECOLOGY | Facility: CLINIC | Age: 70
End: 2025-03-26
Payer: MEDICARE

## 2025-03-26 ENCOUNTER — OFFICE VISIT (OUTPATIENT)
Dept: OBSTETRICS AND GYNECOLOGY | Facility: CLINIC | Age: 70
End: 2025-03-26
Payer: MEDICARE

## 2025-03-26 VITALS
DIASTOLIC BLOOD PRESSURE: 76 MMHG | BODY MASS INDEX: 29.47 KG/M2 | SYSTOLIC BLOOD PRESSURE: 129 MMHG | HEART RATE: 112 BPM | WEIGHT: 141 LBS

## 2025-03-26 DIAGNOSIS — Z12.31 VISIT FOR SCREENING MAMMOGRAM: Primary | ICD-10-CM

## 2025-03-26 DIAGNOSIS — L28.0 LICHEN: ICD-10-CM

## 2025-03-26 DIAGNOSIS — Z01.419 WELL WOMAN EXAM WITH ROUTINE GYNECOLOGICAL EXAM: Primary | ICD-10-CM

## 2025-03-26 PROCEDURE — G0101 CA SCREEN;PELVIC/BREAST EXAM: HCPCS | Performed by: OBSTETRICS & GYNECOLOGY

## 2025-03-26 PROCEDURE — 1160F RVW MEDS BY RX/DR IN RCRD: CPT | Performed by: OBSTETRICS & GYNECOLOGY

## 2025-03-26 PROCEDURE — 77063 BREAST TOMOSYNTHESIS BI: CPT | Performed by: OBSTETRICS & GYNECOLOGY

## 2025-03-26 PROCEDURE — 1159F MED LIST DOCD IN RCRD: CPT | Performed by: OBSTETRICS & GYNECOLOGY

## 2025-03-26 PROCEDURE — 77067 SCR MAMMO BI INCL CAD: CPT | Performed by: OBSTETRICS & GYNECOLOGY

## 2025-03-26 PROCEDURE — 3078F DIAST BP <80 MM HG: CPT | Performed by: OBSTETRICS & GYNECOLOGY

## 2025-03-26 PROCEDURE — 3074F SYST BP LT 130 MM HG: CPT | Performed by: OBSTETRICS & GYNECOLOGY

## 2025-03-26 RX ORDER — HYDROCHLOROTHIAZIDE 25 MG/1
25 TABLET ORAL DAILY
COMMUNITY
Start: 2024-10-23

## 2025-03-26 RX ORDER — PEN NEEDLE, DIABETIC 32GX 5/32"
NEEDLE, DISPOSABLE MISCELLANEOUS
COMMUNITY
Start: 2025-02-05

## 2025-03-26 RX ORDER — LISINOPRIL 40 MG/1
40 TABLET ORAL DAILY
COMMUNITY
Start: 2024-10-23

## 2025-03-26 RX ORDER — AMLODIPINE BESYLATE 5 MG/1
5 TABLET ORAL DAILY
COMMUNITY
Start: 2024-10-23

## 2025-03-26 RX ORDER — GLIMEPIRIDE 4 MG/1
4 TABLET ORAL
COMMUNITY
Start: 2024-10-23

## 2025-03-26 RX ORDER — ZINC GLUCONATE 50 MG
1 TABLET ORAL DAILY
COMMUNITY

## 2025-03-26 RX ORDER — DOCUSATE SODIUM 100 MG/1
50 CAPSULE, LIQUID FILLED ORAL DAILY
COMMUNITY

## 2025-03-26 RX ORDER — FERROUS SULFATE 325(65) MG
1 TABLET ORAL 2 TIMES DAILY
COMMUNITY

## 2025-03-26 RX ORDER — INSULIN GLARGINE 300 U/ML
INJECTION, SOLUTION SUBCUTANEOUS
COMMUNITY

## 2025-03-26 RX ORDER — TRAZODONE HYDROCHLORIDE 50 MG/1
50 TABLET ORAL
COMMUNITY
Start: 2024-10-23

## 2025-03-26 NOTE — PROGRESS NOTES
Patient or patient representative verbalized consent for the use of Ambient Listening during the visit with  Cynthia Vila MD for chart documentation. 3/26/2025  10:46 EDT    History of Present Illness  The patient presents for an annual exam, breast pain, and vulvar lesion.    Vulvar lesion  - She reports a persistent spot on her vulva that exhibits intermittent changes in color and size, occasionally appearing brighter or smaller.  - The spot is associated with sporadic pain but has not exhibited any bleeding or discharge.  - She has been applying triamcinolone cream to the area 3 times weekly.  - A biopsy of the spot was performed in 2019.    Breast pain  - She has been experiencing bilateral breast discomfort, predominantly on the right side, for the past few weeks.  - Despite self-examinations, she has not detected any abnormalities.  - She reports occasional pain upon touch and varying discomfort with different bras.    Supplemental information: She underwent a mammogram today and had a colonoscopy in 06/2023. She had a hysterectomy long ago and has no history of abnormal Pap smears in the last few years. She reports no changes in her menopausal symptoms, which include hot flashes that are less severe than previously experienced. She also reports occasional urinary leakage, which she manages effectively.    Her insulin will be changed next month.    MEDICATIONS  Current: Triamcinolone cream      Vitals:    03/26/25 1008   BP: 129/76   Pulse: 112       Physical Exam  Exam conducted with a chaperone present.   Constitutional:       General: She is not in acute distress.     Appearance: She is well-developed. She is not diaphoretic.   HENT:      Head: Normocephalic and atraumatic.   Neck:      Thyroid: No thyromegaly.      Trachea: No tracheal deviation.   Cardiovascular:      Rate and Rhythm: Normal rate and regular rhythm.      Heart sounds: Normal heart sounds. No murmur heard.     No friction rub. No gallop.    Pulmonary:      Effort: Pulmonary effort is normal. No respiratory distress.      Breath sounds: Normal breath sounds. No wheezing or rales.   Chest:      Chest wall: No tenderness.   Breasts:     Right: No inverted nipple, mass, nipple discharge, skin change or tenderness.      Left: No inverted nipple, mass, nipple discharge, skin change or tenderness.          Comments: Pain delineated by red lines above  Abdominal:      General: There is no distension.      Palpations: Abdomen is soft. There is no mass.      Tenderness: There is no abdominal tenderness.   Genitourinary:     Labia:         Right: Lesion (erythema to the labia minora medial side from 2 o'clock to 4 o'clock, no ulceration or plaques) present. No rash or injury.         Left: No rash, lesion or injury.       Vagina: No vaginal discharge, tenderness or bleeding.      Adnexa: Right adnexa normal and left adnexa normal.      Comments: Surgically absent uterus, cervix  Cuff intact  Musculoskeletal:         General: No deformity. Normal range of motion.   Lymphadenopathy:      Cervical: No cervical adenopathy.   Skin:     General: Skin is warm and dry.      Findings: No rash.   Neurological:      Mental Status: She is alert and oriented to person, place, and time.   Psychiatric:         Behavior: Behavior normal.         Thought Content: Thought content normal.         Judgment: Judgment normal.           Results       Diagnosis Plan   1. Well woman exam with routine gynecological exam        2. Lichen spongiosus            Assessment & Plan  1. Vulvar lesion: Stable. Biopsy performed in 2019.  - Increase triamcinolone cream application to nightly for 6 to 8 weeks.  - Consider rebiopsy if no improvement.  - Call if the lesion itches more or bleeds.  - reviewed path from 2019    2. Breast pain: Acute.  - Ensure bras are appropriately sized.  - Consider further investigation if pain persists beyond 2 to 3 weeks, becomes unilateral, or if a mass is  detected.  - Consider additional imaging if no change in the next month.    3. Health maintenance.  - Colonoscopy in June 2023, next due in 2028.  - Mammogram performed today.  - pap smear screening completed    Follow-up  - Schedule follow-up appointment in mid-May to June to reassess vulvar lesion and consider rebiopsy if necessary.    PROCEDURE  A biopsy of the vulvar lesion was performed in 2019.      Cynthia Vila MD  03/26/25 10:48 EDT

## 2025-04-23 DIAGNOSIS — L28.0 LICHEN: ICD-10-CM

## 2025-04-23 RX ORDER — TRIAMCINOLONE ACETONIDE 5 MG/G
CREAM TOPICAL 2 TIMES DAILY
Qty: 60 G | Refills: 2 | Status: SHIPPED | OUTPATIENT
Start: 2025-04-23

## 2025-04-25 ENCOUNTER — TELEPHONE (OUTPATIENT)
Dept: OBSTETRICS AND GYNECOLOGY | Facility: CLINIC | Age: 70
End: 2025-04-25
Payer: MEDICARE

## 2025-04-25 DIAGNOSIS — L28.0 LICHEN: ICD-10-CM

## 2025-04-25 NOTE — TELEPHONE ENCOUNTER
Says that [facility] is no longer filling patient prescription.  Please see if patient needs me to send her steroid cream (triamcinolone) to some other location.  Thank you

## 2025-04-29 ENCOUNTER — TELEPHONE (OUTPATIENT)
Dept: OBSTETRICS AND GYNECOLOGY | Facility: CLINIC | Age: 70
End: 2025-04-29
Payer: MEDICARE

## 2025-04-29 DIAGNOSIS — L28.0 LICHEN: ICD-10-CM

## 2025-04-29 RX ORDER — TRIAMCINOLONE ACETONIDE 5 MG/G
CREAM TOPICAL 2 TIMES DAILY
Qty: 60 G | Refills: 2 | Status: SHIPPED | OUTPATIENT
Start: 2025-04-29 | End: 2025-04-29 | Stop reason: SDUPTHER

## 2025-04-29 RX ORDER — TRIAMCINOLONE ACETONIDE 5 MG/G
CREAM TOPICAL 2 TIMES DAILY
Qty: 60 G | Refills: 2 | Status: SHIPPED | OUTPATIENT
Start: 2025-04-29

## 2025-04-29 NOTE — TELEPHONE ENCOUNTER
Please call patient and let her know that before seeing this message I sent it to the [facility].  I have now sent to [facility].  Please apologize for any inconvenience but she can just not  the triamcinolone at the [facility] or you can call to cancel it at that pharmacy   Admission

## 2025-04-29 NOTE — TELEPHONE ENCOUNTER
Patient is wanting prescription to go through [id number] - [place], [place] - [address] [facility]. Thank you

## 2025-04-29 NOTE — TELEPHONE ENCOUNTER
Patient is requesting a change of pharmacy for Rx -  triamcinolone (KENALOG) 0.5 % cream [5393]    Please send to the [facility] on file, thanks!     [facility] - [address] [phone] [facility] [phone]

## 2025-05-16 ENCOUNTER — OFFICE VISIT (OUTPATIENT)
Dept: OBSTETRICS AND GYNECOLOGY | Facility: CLINIC | Age: 70
End: 2025-05-16
Payer: MEDICARE

## 2025-05-16 VITALS
SYSTOLIC BLOOD PRESSURE: 142 MMHG | BODY MASS INDEX: 29.81 KG/M2 | HEIGHT: 58 IN | WEIGHT: 142 LBS | HEART RATE: 93 BPM | DIASTOLIC BLOOD PRESSURE: 81 MMHG

## 2025-05-16 DIAGNOSIS — L28.0 LICHEN: Primary | ICD-10-CM

## 2025-05-16 RX ORDER — AMLODIPINE BESYLATE 10 MG/1
10 TABLET ORAL DAILY
COMMUNITY
Start: 2025-04-09

## 2025-05-16 RX ORDER — CONJUGATED ESTROGENS 0.62 MG/G
CREAM VAGINAL
Qty: 30 G | Refills: 0 | Status: SHIPPED | OUTPATIENT
Start: 2025-05-16

## 2025-05-16 NOTE — PROGRESS NOTES
Patient or patient representative verbalized consent for the use of Ambient Listening during the visit with  Cynthia Vila MD for chart documentation. 5/16/2025  10:43 EDT    History of Present Illness  The patient is a 69-year-old female who presents for follow-up of a vulvar lesion.    Vulvar Lesion  - She has been applying a prescribed cream nightly for the past few months, which has resulted in a slight improvement in the condition of the lesion.  - She experiences intermittent itching but reports no bleeding from the lesion.  - She has not previously used vaginal estrogen and has no history of breast cancer.    Breast Tenderness  - She reports experiencing consistent tenderness in both breasts, which she attributes to hormonal fluctuations.  - She has discontinued the use of underwire bras due to this discomfort.        Vitals:    05/16/25 1014   BP: 142/81   Pulse: 93       Physical Exam  Exam conducted with a chaperone present.   Constitutional:       General: She is not in acute distress.     Appearance: She is well-developed. She is not diaphoretic.   HENT:      Head: Normocephalic and atraumatic.   Eyes:      General: No scleral icterus.     Extraocular Movements: Extraocular movements intact.   Pulmonary:      Effort: Pulmonary effort is normal. No respiratory distress.   Genitourinary:     Comments: Normal vulva with some tenderness, no skin breakdown or erythema  Skin:     General: Skin is warm and dry.   Neurological:      General: No focal deficit present.      Mental Status: She is alert and oriented to person, place, and time.   Psychiatric:         Mood and Affect: Mood normal.         Behavior: Behavior normal.         Thought Content: Thought content normal.         Judgment: Judgment normal.           Results  - Imaging:    - Mammogram (2025): Normal     Diagnosis Plan   1. Lichen            Assessment & Plan  1. Vulvar lesion: Stable.  - Apply a pea-sized amount of vaginal estrogen every other  night, may improve tenderness  - Continue using the steroid cream on alternate nights for 2 weeks.  - Transition to using estrogen and steroid cream twice weekly.    2. Breast tenderness.  - Ensure undergarments are appropriately fitted and provide adequate support.  - Contact the clinic if a lump arises or if tenderness persists for more than 2 weeks, especially if unilateral.  - mammogram normal in March 2025,     3. Elevated blood pressure.  - Monitor blood pressure regularly and report any significant changes.    Follow-up  - Check for vaginal estrogen sample.      Cynthia Vila MD  05/16/25 10:41 EDT     Return in about 3 months (around 8/16/2025).

## 2025-05-22 ENCOUNTER — TELEPHONE (OUTPATIENT)
Dept: OBSTETRICS AND GYNECOLOGY | Facility: CLINIC | Age: 70
End: 2025-05-22
Payer: MEDICARE

## 2025-05-22 NOTE — TELEPHONE ENCOUNTER
"  Caller: Cori Lozano \"SHILPI\"    Relationship: Self    Best call back number: [phone]    What is the best time to reach you: ANY    Who are you requesting to speak with (clinical staff, provider,  specific staff member): CLINICAL STAFF     What was the call regarding: DR. GOETZ PRESCRIBED PREMARIN FOR THE PT, WHEN SHE WENT TO THE PHARMACY TO PICK IT UP IT WAS GOING TO COST HER $300 OUT OF POCKET. PT IS WANTING TO KNOW IF THERE IS SOMETHING ELSE THAT WONT BE AS EXPENSIVE.    "

## 2025-05-22 NOTE — TELEPHONE ENCOUNTER
States you sent in Premarin for her to her [facility] pharmacy on 5/16/25. It will cost her $300 out of pocket. Calling to see if you can send something less expensive please. Thank you

## 2025-05-23 RX ORDER — ESTRADIOL 0.1 MG/G
CREAM VAGINAL
Qty: 42.5 G | Refills: 12 | Status: SHIPPED | OUTPATIENT
Start: 2025-05-23

## 2025-05-23 NOTE — TELEPHONE ENCOUNTER
Per Suzi, the estrace vaginal cream is < $25 at [facility]. She may have to pull up the coupon on her phone.  I sent this new script. Thanks

## 2025-05-23 NOTE — TELEPHONE ENCOUNTER
Left voicemail for Jerilyn that Dr Vila said   Per Suzi, the estrace vaginal cream is < $25 at [facility]. She may have to pull up the coupon on her phone.  I sent this new script. Thanks

## 2025-08-22 ENCOUNTER — OFFICE VISIT (OUTPATIENT)
Dept: OBSTETRICS AND GYNECOLOGY | Facility: CLINIC | Age: 70
End: 2025-08-22
Payer: MEDICARE

## 2025-08-22 VITALS
DIASTOLIC BLOOD PRESSURE: 77 MMHG | BODY MASS INDEX: 25.83 KG/M2 | WEIGHT: 140.4 LBS | SYSTOLIC BLOOD PRESSURE: 135 MMHG | HEIGHT: 62 IN | HEART RATE: 92 BPM

## 2025-08-22 DIAGNOSIS — N90.4 LICHEN SCLEROSUS OF FEMALE GENITALIA: Primary | ICD-10-CM

## 2025-08-22 RX ORDER — INSULIN DEGLUDEC 100 U/ML
15 INJECTION, SOLUTION SUBCUTANEOUS NIGHTLY
COMMUNITY
Start: 2025-07-30

## (undated) DEVICE — GOWN PROC ENDOARMOR GI LVL3 HY/SHLD UNIV

## (undated) DEVICE — BITEBLOCK OMNI BLOC

## (undated) DEVICE — SINGLE-USE BIOPSY FORCEPS: Brand: RADIAL JAW 4

## (undated) DEVICE — FLEX ADVANTAGE 1500CC: Brand: FLEX ADVANTAGE

## (undated) DEVICE — VIAL FORMLN CAP 10PCT 20ML

## (undated) DEVICE — Device

## (undated) DEVICE — KT ORCA ORCAPOD DISP STRL

## (undated) DEVICE — CANN NASL CO2 TRULINK W/O2 A/